# Patient Record
Sex: MALE | Race: WHITE | Employment: FULL TIME | URBAN - METROPOLITAN AREA
[De-identification: names, ages, dates, MRNs, and addresses within clinical notes are randomized per-mention and may not be internally consistent; named-entity substitution may affect disease eponyms.]

---

## 2017-01-09 ENCOUNTER — OFFICE VISIT (OUTPATIENT)
Dept: PEDIATRICS | Facility: CLINIC | Age: 37
End: 2017-01-09
Payer: COMMERCIAL

## 2017-01-09 VITALS
DIASTOLIC BLOOD PRESSURE: 80 MMHG | SYSTOLIC BLOOD PRESSURE: 118 MMHG | OXYGEN SATURATION: 98 % | TEMPERATURE: 98 F | HEART RATE: 68 BPM | HEIGHT: 72 IN | BODY MASS INDEX: 26.19 KG/M2 | WEIGHT: 193.4 LBS

## 2017-01-09 DIAGNOSIS — Z72.0 TOBACCO ABUSE: ICD-10-CM

## 2017-01-09 DIAGNOSIS — E10.21 TYPE 1 DIABETES MELLITUS WITH NEPHROPATHY (H): ICD-10-CM

## 2017-01-09 DIAGNOSIS — E78.5 HYPERLIPIDEMIA LDL GOAL <100: ICD-10-CM

## 2017-01-09 DIAGNOSIS — E10.65 TYPE 1 DIABETES MELLITUS WITH HYPERGLYCEMIA (H): Primary | ICD-10-CM

## 2017-01-09 LAB — HBA1C MFR BLD: 8.3 % (ref 4.3–6)

## 2017-01-09 PROCEDURE — 36415 COLL VENOUS BLD VENIPUNCTURE: CPT | Performed by: PEDIATRICS

## 2017-01-09 PROCEDURE — 83036 HEMOGLOBIN GLYCOSYLATED A1C: CPT | Performed by: PEDIATRICS

## 2017-01-09 PROCEDURE — 99214 OFFICE O/P EST MOD 30 MIN: CPT | Performed by: PEDIATRICS

## 2017-01-09 NOTE — NURSING NOTE
"Chief Complaint   Patient presents with     Diabetes     Lipids       Initial /80 mmHg  Pulse 68  Temp(Src) 98  F (36.7  C) (Tympanic)  Ht 5' 11.5\" (1.816 m)  Wt 193 lb 6.4 oz (87.726 kg)  BMI 26.60 kg/m2  SpO2 98% Estimated body mass index is 26.6 kg/(m^2) as calculated from the following:    Height as of this encounter: 5' 11.5\" (1.816 m).    Weight as of this encounter: 193 lb 6.4 oz (87.726 kg).  BP completed using cuff size: regular    "

## 2017-01-09 NOTE — PATIENT INSTRUCTIONS
Keeping working on cutting down on smoking - goal 4 cigs/day at our next visit or less    Continue on the tresiba, but switch your am and pm dosing - 18 units in the am and 16 units in the pm    Come back to see me in 3 months - hopefully we'll get your A1C closer to 7% by then.

## 2017-01-09 NOTE — MR AVS SNAPSHOT
"              After Visit Summary   1/9/2017    Ge Medellin    MRN: [id number]           Patient Information     Date Of Birth          1980        Visit Information        Provider Department      1/9/2017 10:20 AM Lorelei Louis MD [facility]        Today's Diagnoses     Type 1 diabetes mellitus with nephropathy, goal A1C < 7%    -  1     Tobacco abuse           Care Instructions    Keeping working on cutting down on smoking - goal 4 cigs/day at our next visit or less    Continue on the tresiba, but switch your am and pm dosing - 18 units in the am and 16 units in the pm    Come back to see me in 3 months - hopefully we'll get your A1C closer to 7% by then.          Follow-ups after your visit        Who to contact     If you have questions or need follow up information about today's clinic visit or your schedule please contact [facility] directly at [phone].  Normal or non-critical lab and imaging results will be communicated to you by MyChart, letter or phone within 4 business days after the clinic has received the results. If you do not hear from us within 7 days, please contact the clinic through [employer] or phone. If you have a critical or abnormal lab result, we will notify you by phone as soon as possible.  Submit refill requests through [employer] or call your pharmacy and they will forward the refill request to us. Please allow 3 business days for your refill to be completed.          Additional Information About Your Visit        MyChart Information     [employer] lets you send messages to your doctor, view your test results, renew your prescriptions, schedule appointments and more. To sign up, go to [email or web address] . Click on \"Log in\" on the left side of the screen, which will take you to the Welcome page. Then click on \"Sign up Now\" on the right side of the page.     You will be asked to enter the access code listed below, as well as some personal information. " "Please follow the directions to create your username and password.     Your access code is: [email or web address]  Expires: 2017 11:08 AM     Your access code will  in 90 days. If you need help or a new code, please call your [place] clinic or [phone].        Care EveryWhere ID     This is your Care EveryWhere ID. This could be used by other organizations to access your [place] medical records  [id number]        Your Vitals Were     Pulse Temperature Height BMI (Body Mass Index) Pulse Oximetry       68 98  F (36.7  C) (Tympanic) 5' 11.5\" (1.816 m) 26.60 kg/m2 98%        Blood Pressure from Last 3 Encounters:   17 118/80   10/12/16 124/80   16 120/82    Weight from Last 3 Encounters:   17 193 lb 6.4 oz (87.726 kg)   10/12/16 194 lb 12.8 oz (88.361 kg)   16 187 lb 6.4 oz (85.004 kg)              We Performed the Following     Hemoglobin A1c        Primary Care Provider Office Phone # Fax #    Lorelei Louis [phone]       [facility] [address] [place] [address]        Thank you!     Thank you for choosing [facility]  for your care. Our goal is always to provide you with excellent care. Hearing back from our patients is one way we can continue to improve our services. Please take a few minutes to complete the written survey that you may receive in the mail after your visit with us. Thank you!             Your Updated Medication List - Protect others around you: Learn how to safely use, store and throw away your medicines at www.disposemymeds.org.          This list is accurate as of: 17 11:08 AM.  Always use your most recent med list.                   Brand Name Dispense Instructions for use    ASPIRIN NOT PRESCRIBED    INTENTIONAL    0 each    continuous prn Antiplatelet medication not prescribed intentionally due to Refusal by patient       * blood glucose monitoring test strip    no brand specified    450 strip    5 times " daily. Dispense one touch ultra strips per insurance coverage5 times daily. Dispense one touch ultra strips per insurance coverage       * blood glucose monitoring test strip    no brand specified    300 strip    1 strip by In Vitro route 4 times daily       * blood glucose monitoring test strip    ONE TOUCH VERIO IQ    400 each    Use to test blood sugar 4 times daily or as directed.       * DIABETIC STERILE LANCETS device     1 Box    1 Device daily.       * blood glucose monitoring lancets     2 Box    Use to test blood sugars 4 times daily or as directed.       insulin degludec 100 UNIT/ML pen    TRESIBA    12 mL    Inject 34 Units Subcutaneous daily       insulin lispro 100 UNIT/ML injection    HumaLOG    3 Month    Inject  Subcutaneous 3 times daily (before meals). 1:10 carb correction with every meal - estimated use 30 units       lisinopril 2.5 MG tablet    PRINIVIL/Zestril    90 tablet    Take 1 tablet (2.5 mg) by mouth daily       simvastatin 40 MG tablet    ZOCOR    90 tablet    Take 1 tablet (40 mg) by mouth At Bedtime       * Notice:  This list has 5 medication(s) that are the same as other medications prescribed for you. Read the directions carefully, and ask your doctor or other care provider to review them with you.

## 2017-01-09 NOTE — PROGRESS NOTES
"  SUBJECTIVE:                                                    Ge Medellin is a 36 year old male who presents to clinic today for the following health issues:      Diabetes Follow-up    Patient is checking blood sugars: four times daily.  Results are as follows:         Averages as follow; 7 day -218, 14 day - 241, 30 day - 240, 90 day - 239day     Diabetic concerns: None     Symptoms of hypoglycemia (low blood sugar): none     Paresthesias (numbness or burning in feet) or sores: No     Date of last diabetic eye exam: 07/2016    One low that was significant since our last visit - 59 - 12/21  Fasting levels have been around 170.  Still working mostly days - sometimes has to help other shifts.    Overall, has done well with the switch to Tresiba - no side effects.    Walking more and staying active with current job - reviewed weight curves.    Has seen a significant improvement in HgbA1C over the past year.  Still not to goal.       Hyperlipidemia Follow-Up      Rate your low fat/cholesterol diet?: fair    Taking statin?  No    Other lipid medications/supplements?:  None    Doing well on statin - no side effects.       Amount of exercise or physical activity: None    Problems taking medications regularly: No    Medication side effects: none    Diet: low salt      Smoking - down to 5-6 cigs/day.   Not sure that he wants to quit, but aware that cutting down would be beneficial.    No cardiac symptoms.  No new numbness/tingling/weakness.    Problem list and histories reviewed & adjusted, as indicated.  Additional history: as documented    Problem list, Medication list, Allergies, and Medical/Social/Surgical histories reviewed in EPIC and updated as appropriate.    ROS:  Constitutional, endo, cardiovascular, pulmonarysystems are negative, except as otherwise noted.    OBJECTIVE:                                                    /80 mmHg  Pulse 68  Temp(Src) 98  F (36.7  C) (Tympanic)  Ht 5' 11.5\" (1.816 m)  Wt " 193 lb 6.4 oz (87.726 kg)  BMI 26.60 kg/m2  SpO2 98%  Body mass index is 26.6 kg/(m^2).  GENERAL: healthy, alert and no distress  RESP: lungs clear to auscultation - no rales, rhonchi or wheezes  CV: regular rate and rhythm, normal S1 S2, no S3 or S4, no murmur, click or rub, no peripheral edema and peripheral pulses strong  PSYCH: mentation appears normal, affect normal/bright    Diagnostic Test Results:  Results for orders placed or performed in visit on 01/09/17 (from the past 24 hour(s))   Hemoglobin A1c   Result Value Ref Range    Hemoglobin A1C 8.3 (H) 4.3 - 6.0 %        ASSESSMENT/PLAN:                                                        ICD-10-CM    1. Type 1 diabetes mellitus with hyperglycemia (H) E10.65 Remains uncontrolled, but improving.  Discussed adjustments to Tresiba based on blood sugar.  Patient prefers twice daily dosing.  Change dosing to 18 units q am and 16 units q pm.  Alert me with significant lows if they become a trend.   Continue to increase activity.  Follow up in 3 months.   2. Type 1 diabetes mellitus with nephropathy, goal A1C < 7% E10.21 Hemoglobin A1c   3. Tobacco abuse Z72.0 Continued to encourage smoking cessation   4.      Hyperlipidemia - well controlled - continue statin    See Patient Instructions    Lorelei Louis MD  [facility]  \

## 2017-01-30 DIAGNOSIS — E10.29 TYPE 1 DIABETES MELLITUS WITH MICROALBUMINURIA (H): Primary | ICD-10-CM

## 2017-01-30 DIAGNOSIS — R80.9 TYPE 1 DIABETES MELLITUS WITH MICROALBUMINURIA (H): Primary | ICD-10-CM

## 2017-01-30 NOTE — TELEPHONE ENCOUNTER
insulin lispro (HUMALOG) 100 UNIT/ML VIAL          Last Written Prescription Date: 07/22/2016  Last Fill Quantity: 3 boxes, # refills: 3  Last Office Visit with [facility], [facility] or [facility] prescribing provider:  01/19/2017        BP Readings from Last 3 Encounters:   01/09/17 118/80   10/12/16 124/80   07/27/16 120/82     MICROL       <5   10/5/2016  No results found for this basename: microalbumin  CREATININE   Date Value Ref Range Status   10/05/2016 0.86 0.66 - 1.25 mg/dL Final   ]  GFR ESTIMATE   Date Value Ref Range Status   10/05/2016 >90  Non  GFR Calc   >60 mL/min/1.7m2 Final   10/31/2015 >90  Non  GFR Calc   >60 mL/min/1.7m2 Final   05/28/2015 >90  Non  GFR Calc   >60 mL/min/1.7m2 Final     GFR ESTIMATE IF BLACK   Date Value Ref Range Status   10/05/2016 >90   GFR Calc   >60 mL/min/1.7m2 Final   10/31/2015 >90   GFR Calc   >60 mL/min/1.7m2 Final   05/28/2015 >90   GFR Calc   >60 mL/min/1.7m2 Final     CHOL      155   10/5/2016  HDL       56   10/5/2016  LDL       90   10/5/2016  TRIG       45   10/5/2016  CHOLHDLRATIO      2.6   10/31/2015  AST       14   10/5/2016  ALT       25   10/5/2016  A1C      8.3   1/9/2017  A1C      9.1   10/5/2016  A1C     11.7   6/22/2016  A1C     10.8   10/31/2015  A1C     10.9   5/28/2015  POTASSIUM   Date Value Ref Range Status   10/05/2016 4.4 3.4 - 5.3 mmol/L Final

## 2017-01-31 NOTE — TELEPHONE ENCOUNTER
Prescription approved per [facility] Refill Protocol.    Cristel Lennon, RN, BSN, PHN

## 2017-04-19 ENCOUNTER — TELEPHONE (OUTPATIENT)
Dept: PEDIATRICS | Facility: CLINIC | Age: 37
End: 2017-04-19

## 2017-04-19 NOTE — TELEPHONE ENCOUNTER
Type of outreach:  Phone, left message for patient to call back.   Health Maintenance Due   Topic Date Due     DIABETIC EDUCATION Q2 YEAR (NO INBASKET)  07/22/1982     DIABETIC EDUCATION Q1 YEAR (NO INBASKET)  06/03/2015     A1C Q3 MO( NO INBASKET)  04/09/2017     Patient due for DM OV, please assist pt in scheduling.     Teresa Leroy MA

## 2017-05-25 ENCOUNTER — TELEPHONE (OUTPATIENT)
Dept: PEDIATRICS | Facility: CLINIC | Age: 37
End: 2017-05-25

## 2017-05-25 DIAGNOSIS — E10.65 TYPE 1 DIABETES MELLITUS WITH HYPERGLYCEMIA (H): Primary | ICD-10-CM

## 2017-05-25 NOTE — TELEPHONE ENCOUNTER
Type of outreach:  Phone, spoke to patient.  Informed pt DM visit is due, assisted in scheduling 07/03- pt plans on having A1C done in AM, Labs futured.  Health Maintenance Due   Topic Date Due     DIABETIC EDUCATION Q2 YEAR  07/22/1982     DIABETIC EDUCATION Q1 YEAR  06/03/2015     A1C Q3 MO  04/09/2017     Teresa Leroy MA

## 2017-07-03 ENCOUNTER — OFFICE VISIT (OUTPATIENT)
Dept: PEDIATRICS | Facility: CLINIC | Age: 37
End: 2017-07-03
Payer: COMMERCIAL

## 2017-07-03 VITALS
TEMPERATURE: 98.4 F | DIASTOLIC BLOOD PRESSURE: 80 MMHG | HEIGHT: 72 IN | BODY MASS INDEX: 26.41 KG/M2 | WEIGHT: 195 LBS | SYSTOLIC BLOOD PRESSURE: 118 MMHG | HEART RATE: 72 BPM | OXYGEN SATURATION: 98 %

## 2017-07-03 DIAGNOSIS — E10.65 TYPE 1 DIABETES MELLITUS WITH HYPERGLYCEMIA (H): ICD-10-CM

## 2017-07-03 DIAGNOSIS — R80.9 TYPE 1 DIABETES MELLITUS WITH MICROALBUMINURIA (H): ICD-10-CM

## 2017-07-03 DIAGNOSIS — E10.21 TYPE 1 DIABETES MELLITUS WITH NEPHROPATHY (H): Primary | ICD-10-CM

## 2017-07-03 DIAGNOSIS — E10.29 TYPE 1 DIABETES MELLITUS WITH MICROALBUMINURIA (H): ICD-10-CM

## 2017-07-03 DIAGNOSIS — Z72.0 TOBACCO ABUSE: ICD-10-CM

## 2017-07-03 DIAGNOSIS — E78.5 HYPERLIPIDEMIA LDL GOAL <100: ICD-10-CM

## 2017-07-03 LAB — HBA1C MFR BLD: 7.7 % (ref 4.3–6)

## 2017-07-03 PROCEDURE — 99214 OFFICE O/P EST MOD 30 MIN: CPT | Performed by: PEDIATRICS

## 2017-07-03 PROCEDURE — 36415 COLL VENOUS BLD VENIPUNCTURE: CPT | Performed by: PEDIATRICS

## 2017-07-03 PROCEDURE — 83036 HEMOGLOBIN GLYCOSYLATED A1C: CPT | Performed by: PEDIATRICS

## 2017-07-03 RX ORDER — SIMVASTATIN 40 MG
40 TABLET ORAL AT BEDTIME
Qty: 90 TABLET | Refills: 3 | Status: SHIPPED | OUTPATIENT
Start: 2017-07-03 | End: 2018-08-06

## 2017-07-03 NOTE — MR AVS SNAPSHOT
"              After Visit Summary   7/3/2017    Ge Medellin    MRN: [id number]           Patient Information     Date Of Birth          1980        Visit Information        Provider Department      7/3/2017 7:00 AM Lorelei Louis MD [facility]        Today's Diagnoses     Type 1 diabetes mellitus with nephropathy, goal A1C < 7%    -  1    Type 1 diabetes mellitus with hyperglycemia (H)        Type 1 diabetes mellitus with microalbuminuria (H)        Hyperlipidemia LDL goal <100        Tobacco abuse          Care Instructions    Keep watching lows - if they continue, move tresiba to the morning - keep me posted    Let me know if you need anything to help with quitting smoking    Eye check - schedule this fall          Follow-ups after your visit        Who to contact     If you have questions or need follow up information about today's clinic visit or your schedule please contact [facility] directly at [phone].  Normal or non-critical lab and imaging results will be communicated to you by MyChart, letter or phone within 4 business days after the clinic has received the results. If you do not hear from us within 7 days, please contact the clinic through [employer] or phone. If you have a critical or abnormal lab result, we will notify you by phone as soon as possible.  Submit refill requests through [employer] or call your pharmacy and they will forward the refill request to us. Please allow 3 business days for your refill to be completed.          Additional Information About Your Visit        MyChart Information     [employer] lets you send messages to your doctor, view your test results, renew your prescriptions, schedule appointments and more. To sign up, go to [email or web address] . Click on \"Log in\" on the left side of the screen, which will take you to the Welcome page. Then click on \"Sign up Now\" on the right side of the page.     You will be asked to enter the access code " "listed below, as well as some personal information. Please follow the directions to create your username and password.     Your access code is: [email or web address]  Expires: 10/1/2017  7:36 AM     Your access code will  in 90 days. If you need help or a new code, please call your [place] clinic or [phone].        Care EveryWhere ID     This is your Care EveryWhere ID. This could be used by other organizations to access your [place] medical records  [id number]        Your Vitals Were     Pulse Temperature Height Pulse Oximetry BMI (Body Mass Index)       72 98.4  F (36.9  C) (Tympanic) 5' 11.5\" (1.816 m) 98% 26.82 kg/m2        Blood Pressure from Last 3 Encounters:   17 118/80   17 118/80   10/12/16 124/80    Weight from Last 3 Encounters:   17 195 lb (88.5 kg)   17 193 lb 6.4 oz (87.7 kg)   10/12/16 194 lb 12.8 oz (88.4 kg)              We Performed the Following     Comprehensive metabolic panel     Lipid Profile with reflex to direct LDL          Where to get your medicines      These medications were sent to [facility] [address] [place] [address] [id number] [place] [address]  [id number] [place] [address]     Phone:  [phone]     simvastatin 40 MG tablet          Primary Care Provider Office Phone # Fax #    Lorelei Louis [phone]       [place] [facility] [address] [place] [address]        Equal Access to Services     [facility]: Hadii teri loza Solauren, waaxda luqadaha, qaybta kaalmadanny moser [facility] dias. So [facility] [place] [facility] [phone].    ATENCIÓN: Si habla español, tiene a ramsey disposición servicios gratuitos de asistencia lingüística. Llame al [phone].    We comply with applicable federal civil rights laws and [place] laws. We do not discriminate on the basis of race, color, national origin, age, disability sex, sexual orientation or gender identity.            Thank you!     " Thank you for choosing [facility]  for your care. Our goal is always to provide you with excellent care. Hearing back from our patients is one way we can continue to improve our services. Please take a few minutes to complete the written survey that you may receive in the mail after your visit with us. Thank you!             Your Updated Medication List - Protect others around you: Learn how to safely use, store and throw away your medicines at www.disposemymeds.org.          This list is accurate as of: 7/3/17  7:36 AM.  Always use your most recent med list.                   Brand Name Dispense Instructions for use Diagnosis    ASPIRIN NOT PRESCRIBED    INTENTIONAL    0 each    continuous prn Antiplatelet medication not prescribed intentionally due to Refusal by patient        * blood glucose monitoring test strip    no brand specified    450 strip    5 times daily. Dispense one touch ultra strips per insurance coverage5 times daily. Dispense one touch ultra strips per insurance coverage    Type 1 diabetes, HbA1c goal < 7% (H)       * blood glucose monitoring test strip    no brand specified    300 strip    1 strip by In Vitro route 4 times daily    Type 1 diabetes, HbA1c goal < 7% (H)       * blood glucose monitoring test strip    ONE TOUCH VERIO IQ    400 each    Use to test blood sugar 4 times daily or as directed.    Type 1 diabetes mellitus with nephropathy (H)       * DIABETIC STERILE LANCETS device     1 Box    1 Device daily.    Type 1 diabetes, HbA1c goal < 7% (H)       * blood glucose monitoring lancets     2 Box    Use to test blood sugars 4 times daily or as directed.    Type 1 diabetes mellitus with nephropathy (H)       insulin degludec 100 UNIT/ML pen    TRESIBA    12 mL    Inject 34 Units Subcutaneous daily    Type 1 diabetes mellitus with hyperglycemia (H)       insulin lispro 100 UNIT/ML injection    HumaLOG    3 vial    Inject  Subcutaneous 3 times daily (before meals). 1:10 carb  correction with every meal - estimated use 30 units    Type 1 diabetes mellitus with microalbuminuria (H)       lisinopril 2.5 MG tablet    PRINIVIL/Zestril    90 tablet    Take 1 tablet (2.5 mg) by mouth daily    Type 1 diabetes mellitus with hyperglycemia (H), Type 1 diabetes mellitus with microalbuminuria (H)       simvastatin 40 MG tablet    ZOCOR    90 tablet    Take 1 tablet (40 mg) by mouth At Bedtime    Hyperlipidemia LDL goal <100, Type 1 diabetes mellitus with hyperglycemia (H), Type 1 diabetes mellitus with microalbuminuria (H)       * Notice:  This list has 5 medication(s) that are the same as other medications prescribed for you. Read the directions carefully, and ask your doctor or other care provider to review them with you.

## 2017-07-03 NOTE — PROGRESS NOTES
SUBJECTIVE:                                                    Ge Medellin is a 36 year old male who presents to clinic today for the following health issues:      Diabetes Follow-up    Patient is checking blood sugars: 3-4 times daily.    Weekly avg 176  Bi weekly avg 210  Monthly avg 209  Blood sugar testing frequency justification: Uncontrolled diabetes    Diabetic concerns: None     Symptoms of hypoglycemia (low blood sugar): weak     Paresthesias (numbness or burning in feet) or sores: No     Date of last diabetic eye exam: pt reports due in fall     Had continuous monitor last October.  Current regimen - taking 34 units long acting insulin at night, short acting insulin - 1 unit per 10 carb units (averaging 15 units per day).    Working days - better for control of blood sugar    Blood sugars stable despite activity level changes.    July 1st had a low at 50 in the middle of the night that woke him up.   About one week prior to that, had another low at night.        Hyperlipidemia Follow-Up      Rate your low fat/cholesterol diet?: fair    Taking statin?  Yes, no muscle aches from statin    Other lipid medications/supplements?:  none      Amount of exercise or physical activity: None    Problems taking medications regularly: No    Medication side effects: none    Diet: low fat/cholesterol        Tobacco use - moving to a place where roommates don't smoke.  Currently smoking about 15 cigarettes per day work days.  Has the nicotine gum ready to go for a quit date this coming weekend.      Problem list and histories reviewed & adjusted, as indicated.  Additional history: as documented    Reviewed and updated as needed this visit by clinical staff  Tobacco  Allergies  Med Hx  Surg Hx  Fam Hx  Soc Hx      Reviewed and updated as needed this visit by Provider         ROS:  Constitutional, cardiovascular, pulmonary, gi and neuro systems are negative, except as otherwise noted.    OBJECTIVE:     /80 (BP  "Location: Right arm, Patient Position: Right side, Cuff Size: Adult Regular)  Pulse 72  Temp 98.4  F (36.9  C) (Tympanic)  Ht 5' 11.5\" (1.816 m)  Wt 195 lb (88.5 kg)  SpO2 98%  BMI 26.82 kg/m2  Body mass index is 26.82 kg/(m^2).  GENERAL: alert and no distress  HENT: nose and mouth without ulcers or lesions, oropharynx clear and oral mucous membranes moist  RESP: lungs clear to auscultation - no rales, rhonchi or wheezes  ABDOMEN: soft, nontender, no hepatosplenomegaly, no masses and bowel sounds normal  PSYCH: mentation appears normal, affect normal/bright    Diagnostic Test Results:  Results for orders placed or performed in visit on 07/03/17 (from the past 24 hour(s))   Hemoglobin A1c   Result Value Ref Range    Hemoglobin A1C 7.7 (H) 4.3 - 6.0 %       ASSESSMENT/PLAN:         ICD-10-CM    1. Type 1 diabetes mellitus with nephropathy, goal A1C < 7% E10.21 Comprehensive metabolic panel     Lipid Profile with reflex to direct LDL    Improving control - congratulated patient today.  Has had a few lows since our last visit - recommended considering moving tresiba dosing to morning to see if this makes a difference - currently taking at night.  If lows remain, may need to adjust short acting insulin formulation.   2. Type 1 diabetes mellitus with hyperglycemia (H) E10.65 Comprehensive metabolic panel     Lipid Profile with reflex to direct LDL     simvastatin (ZOCOR) 40 MG tablet   3. Type 1 diabetes mellitus with microalbuminuria (H) E10.29 simvastatin (ZOCOR) 40 MG tablet    R80.9    4. Hyperlipidemia LDL goal <100 E78.5 Comprehensive metabolic panel     Lipid Profile with reflex to direct LDL     simvastatin (ZOCOR) 40 MG tablet  Well controlled, continue current medications     5. Tobacco abuse Z72.0 Planned quit date for this weekend - patient to alert me if he needs anything else to help with smoking cessation efforts.       See Patient Instructions    Lorelei Louis MD  [facility]    "

## 2017-07-03 NOTE — PATIENT INSTRUCTIONS
Keep watching lows - if they continue, move tresiba to the morning - keep me posted    Let me know if you need anything to help with quitting smoking    Eye check - schedule this fall

## 2017-07-03 NOTE — NURSING NOTE
"Chief Complaint   Patient presents with     Diabetes     Lipids       Initial /80 (BP Location: Right arm, Patient Position: Right side, Cuff Size: Adult Regular)  Pulse 72  Temp 98.4  F (36.9  C) (Tympanic)  Ht 5' 11.5\" (1.816 m)  Wt 195 lb (88.5 kg)  SpO2 98%  BMI 26.82 kg/m2 Estimated body mass index is 26.82 kg/(m^2) as calculated from the following:    Height as of this encounter: 5' 11.5\" (1.816 m).    Weight as of this encounter: 195 lb (88.5 kg).  Medication Reconciliation: complete  "

## 2017-10-24 DIAGNOSIS — E10.65 TYPE 1 DIABETES MELLITUS WITH HYPERGLYCEMIA (H): ICD-10-CM

## 2017-10-24 DIAGNOSIS — E10.29 TYPE 1 DIABETES MELLITUS WITH MICROALBUMINURIA (H): ICD-10-CM

## 2017-10-24 DIAGNOSIS — R80.9 TYPE 1 DIABETES MELLITUS WITH MICROALBUMINURIA (H): ICD-10-CM

## 2017-11-03 RX ORDER — LISINOPRIL 2.5 MG/1
2.5 TABLET ORAL DAILY
Qty: 30 TABLET | Refills: 0 | Status: SHIPPED | OUTPATIENT
Start: 2017-11-03 | End: 2017-12-27

## 2017-11-03 NOTE — TELEPHONE ENCOUNTER
TC-patient is due for blood work-orders placed. Please call to schedule a fasting lab appointment.  Medication is being filled for 1 time refill only due to:  Future labs ordered yes.     Angela Coleman RN  Message handled by Nurse Triage.

## 2017-11-15 DIAGNOSIS — E10.65 TYPE 1 DIABETES MELLITUS WITH HYPERGLYCEMIA (H): ICD-10-CM

## 2017-11-17 RX ORDER — INSULIN DEGLUDEC 100 U/ML
INJECTION, SOLUTION SUBCUTANEOUS
Qty: 45 ML | Refills: 0 | Status: SHIPPED | OUTPATIENT
Start: 2017-11-17 | End: 2018-02-05

## 2017-11-17 NOTE — TELEPHONE ENCOUNTER
Please call patient to schedule a fasting lab appointment.   The orders are in place.  Jayne Cox, LADARIUS  Triage Nurse

## 2017-11-17 NOTE — TELEPHONE ENCOUNTER
Requested Prescriptions   Pending Prescriptions Disp Refills     TRESIBA FLEXTOUCH 100 UNIT/ML pen [Pharmacy Med Name: TRESIBA FLEXTOUCH 100UNIT INJ]  11     Sig: INJECT 34 UNITS SUBCUTANEOUS DAILY    Long Acting Insulin Protocol Failed    11/15/2017  1:10 PM       Failed - LDL on file in past 12 months    Recent Labs   Lab Test  10/05/16   0710   LDL  90            Failed - Microalbumin on file in past 12 months    Recent Labs   Lab Test  10/05/16   0710   MICROL  <5   UMALCR  Unable to calculate due to low value            Failed - Serum creatinine on file in past 12 months    Recent Labs   Lab Test  10/05/16   0710   CR  0.86            Passed - Blood pressure less than 140/90 in past 6 months    BP Readings from Last 3 Encounters:   07/03/17 118/80   01/09/17 118/80   10/12/16 124/80            Passed - HgbA1C in past 3 or 6 months    Recent Labs   Lab Test  07/03/17   0703   A1C  7.7*            Passed - Patient is age 18 or older       Passed - Recent visit with authorizing provider's specialty in past 6 months    Patient had office visit in the last 6 months or has a visit in the next 30 days with authorizing provider.  See chart review.             Medication is being filled for 1 time refill only due to:  Due for fasting cholesterol.  Due for next diabetic check March.   Refilled today.  Jayne Cox, RN  Triage Nurse

## 2017-12-11 DIAGNOSIS — E10.21 TYPE 1 DIABETES MELLITUS WITH NEPHROPATHY (H): ICD-10-CM

## 2017-12-11 DIAGNOSIS — E10.65 TYPE 1 DIABETES MELLITUS WITH HYPERGLYCEMIA (H): ICD-10-CM

## 2017-12-11 DIAGNOSIS — E78.5 HYPERLIPIDEMIA LDL GOAL <100: ICD-10-CM

## 2017-12-11 LAB
ALBUMIN SERPL-MCNC: 3.9 G/DL (ref 3.4–5)
ALP SERPL-CCNC: 78 U/L (ref 40–150)
ALT SERPL W P-5'-P-CCNC: 21 U/L (ref 0–70)
ANION GAP SERPL CALCULATED.3IONS-SCNC: 8 MMOL/L (ref 3–14)
AST SERPL W P-5'-P-CCNC: 9 U/L (ref 0–45)
BILIRUB SERPL-MCNC: 0.4 MG/DL (ref 0.2–1.3)
BUN SERPL-MCNC: 13 MG/DL (ref 7–30)
CALCIUM SERPL-MCNC: 9.4 MG/DL (ref 8.5–10.1)
CHLORIDE SERPL-SCNC: 102 MMOL/L (ref 94–109)
CHOLEST SERPL-MCNC: 144 MG/DL
CO2 SERPL-SCNC: 28 MMOL/L (ref 20–32)
CREAT SERPL-MCNC: 0.7 MG/DL (ref 0.66–1.25)
GFR SERPL CREATININE-BSD FRML MDRD: >90 ML/MIN/1.7M2
GLUCOSE SERPL-MCNC: 153 MG/DL (ref 70–99)
HDLC SERPL-MCNC: 60 MG/DL
LDLC SERPL CALC-MCNC: 69 MG/DL
NONHDLC SERPL-MCNC: 84 MG/DL
POTASSIUM SERPL-SCNC: 4.1 MMOL/L (ref 3.4–5.3)
PROT SERPL-MCNC: 7.4 G/DL (ref 6.8–8.8)
SODIUM SERPL-SCNC: 138 MMOL/L (ref 133–144)
TRIGL SERPL-MCNC: 73 MG/DL

## 2017-12-11 PROCEDURE — 80061 LIPID PANEL: CPT | Performed by: PEDIATRICS

## 2017-12-11 PROCEDURE — 36415 COLL VENOUS BLD VENIPUNCTURE: CPT | Performed by: PEDIATRICS

## 2017-12-11 PROCEDURE — 80053 COMPREHEN METABOLIC PANEL: CPT | Performed by: PEDIATRICS

## 2017-12-13 DIAGNOSIS — E10.21 TYPE 1 DIABETES MELLITUS WITH NEPHROPATHY (H): Primary | ICD-10-CM

## 2017-12-27 DIAGNOSIS — R80.9 TYPE 1 DIABETES MELLITUS WITH MICROALBUMINURIA (H): ICD-10-CM

## 2017-12-27 DIAGNOSIS — E10.29 TYPE 1 DIABETES MELLITUS WITH MICROALBUMINURIA (H): ICD-10-CM

## 2017-12-27 DIAGNOSIS — E10.65 TYPE 1 DIABETES MELLITUS WITH HYPERGLYCEMIA (H): ICD-10-CM

## 2017-12-27 NOTE — TELEPHONE ENCOUNTER
Requested Prescriptions   Pending Prescriptions Disp Refills     lisinopril (PRINIVIL/ZESTRIL) 2.5 MG tablet [Pharmacy Med Name: LISINOPRIL 2.5MG    TAB]    Last Written Prescription Date:  11/3/2017  Last Fill Quantity: 30,  # refills: 0   Last Office Visit with [facility], [facility] or [facility] prescribing provider:  7/3/2017   Future Office Visit:    Next 5 appointments (look out 90 days)     Feb 05, 2018  8:00 AM CST   SHORT with Lorelei Louis MD   [facility]    [address] [place] [address] [address]  [facility] [address]   [phone]                  30 tablet 0     Sig: TAKE ONE TABLET BY MOUTH ONCE DAILY.   PATIENT  NEEDS  LABS  ORDERED.    ACE Inhibitors (Including Combos) Protocol Passed    12/27/2017 11:27 AM       Passed - Blood pressure under 140/90    BP Readings from Last 3 Encounters:   07/03/17 118/80   01/09/17 118/80   10/12/16 124/80                Passed - Recent or future visit with authorizing provider's specialty    Patient had office visit in the last year or has a visit in the next 30 days with authorizing provider.  See chart review.              Passed - Patient is age 18 or older       Passed - Normal serum creatinine on file in past 12 months    Recent Labs   Lab Test  12/11/17   0835   CR  0.70            Passed - Normal serum potassium on file in past 12 months    Recent Labs   Lab Test  12/11/17   0835   POTASSIUM  4.1

## 2017-12-27 NOTE — TELEPHONE ENCOUNTER
Requested Prescriptions   Pending Prescriptions Disp Refills     insulin lispro (HUMALOG) 100 UNIT/ML injection    Last Written Prescription Date:  1/13/2017  Last Fill Quantity: 3 ,  # refills: 1   Last Office Visit with [facility], [facility] or [facility] prescribing provider:  7/3/2017   Future Office Visit:    Next 5 appointments (look out 90 days)     Feb 05, 2018  8:00 AM CST   SHORT with Lorelei Louis MD   [facility] ([facility]    [address] [place] [address]  [address]  [facility] [address] [phone]                  3 vial 1     Sig: Inject  Subcutaneous 3 times daily (before meals). 1:10 carb correction with every meal - estimated use 30 units    Short Acting Insulin Protocol Failed    12/27/2017 12:49 PM       Failed - Microalbumin on file in past 12 months    Recent Labs   Lab Test  10/05/16   0710   MICROL  <5   UMALCR  Unable to calculate due to low value            Passed - Blood pressure less than 140/90 in past 6 months    BP Readings from Last 3 Encounters:   07/03/17 118/80   01/09/17 118/80   10/12/16 124/80                Passed - LDL on file in past 12 months    Recent Labs   Lab Test  12/11/17   0835   LDL  69            Passed - Serum creatinine on file in past 12 months    Recent Labs   Lab Test  12/11/17   0835   CR  0.70            Passed - HgbA1C in past 3 or 6 months    Recent Labs   Lab Test  07/03/17   0703   A1C  7.7*            Passed - Patient is age 18 or older       Passed - Recent visit with authorizing provider's specialty in past 6 months    Patient had office visit in the last 6 months or has a visit in the next 30 days with authorizing provider.  See chart review.

## 2017-12-29 RX ORDER — LISINOPRIL 2.5 MG/1
2.5 TABLET ORAL DAILY
Qty: 90 TABLET | Refills: 1 | Status: SHIPPED | OUTPATIENT
Start: 2017-12-29 | End: 2018-08-06

## 2017-12-29 NOTE — TELEPHONE ENCOUNTER
Pharmacy called, Humalog vials were sent, patient was picking up Humalog pens.   Changed rx and resent per Dr. Louis's previous orders

## 2018-02-05 ENCOUNTER — OFFICE VISIT (OUTPATIENT)
Dept: PEDIATRICS | Facility: CLINIC | Age: 38
End: 2018-02-05
Payer: COMMERCIAL

## 2018-02-05 VITALS
SYSTOLIC BLOOD PRESSURE: 118 MMHG | WEIGHT: 191 LBS | DIASTOLIC BLOOD PRESSURE: 78 MMHG | TEMPERATURE: 97.3 F | OXYGEN SATURATION: 98 % | BODY MASS INDEX: 25.87 KG/M2 | HEIGHT: 72 IN | HEART RATE: 74 BPM

## 2018-02-05 DIAGNOSIS — E10.29 TYPE 1 DIABETES MELLITUS WITH MICROALBUMINURIA (H): ICD-10-CM

## 2018-02-05 DIAGNOSIS — E10.21 TYPE 1 DIABETES MELLITUS WITH NEPHROPATHY (H): Primary | ICD-10-CM

## 2018-02-05 DIAGNOSIS — E78.5 HYPERLIPIDEMIA LDL GOAL <100: ICD-10-CM

## 2018-02-05 DIAGNOSIS — R80.9 TYPE 1 DIABETES MELLITUS WITH MICROALBUMINURIA (H): ICD-10-CM

## 2018-02-05 DIAGNOSIS — E10.65 TYPE 1 DIABETES MELLITUS WITH HYPERGLYCEMIA (H): ICD-10-CM

## 2018-02-05 DIAGNOSIS — Z72.0 TOBACCO ABUSE: ICD-10-CM

## 2018-02-05 LAB
CREAT UR-MCNC: 63 MG/DL
HBA1C MFR BLD: 9 % (ref 4.3–6)
MICROALBUMIN UR-MCNC: 7 MG/L
MICROALBUMIN/CREAT UR: 11.61 MG/G CR (ref 0–17)
TSH SERPL DL<=0.005 MIU/L-ACNC: 0.84 MU/L (ref 0.4–4)

## 2018-02-05 PROCEDURE — 99214 OFFICE O/P EST MOD 30 MIN: CPT | Performed by: PEDIATRICS

## 2018-02-05 PROCEDURE — 83036 HEMOGLOBIN GLYCOSYLATED A1C: CPT | Performed by: PEDIATRICS

## 2018-02-05 PROCEDURE — 99207 C FOOT EXAM  NO CHARGE: CPT | Performed by: PEDIATRICS

## 2018-02-05 PROCEDURE — 82043 UR ALBUMIN QUANTITATIVE: CPT | Performed by: PEDIATRICS

## 2018-02-05 PROCEDURE — 84443 ASSAY THYROID STIM HORMONE: CPT | Performed by: PEDIATRICS

## 2018-02-05 PROCEDURE — 36415 COLL VENOUS BLD VENIPUNCTURE: CPT | Performed by: PEDIATRICS

## 2018-02-05 NOTE — MR AVS SNAPSHOT
After Visit Summary   2/5/2018    Ge Medellin    MRN: [id number]           Patient Information     Date Of Birth          1980        Visit Information        Provider Department      2/5/2018 8:00 AM Lorelei Louis MD [facility]        Today's Diagnoses     Type 1 diabetes mellitus with nephropathy, goal A1C < 7%    -  1    Hyperlipidemia LDL goal <100        Type 1 diabetes mellitus with microalbuminuria (H)        Type 1 diabetes mellitus with hyperglycemia (H)          Care Instructions    Start covering your carbs carefully    Labs today on your way out    Make an eye appt - #'s below    Come back to see me in about 3 months            Follow-ups after your visit        Additional Services     OPHTHALMOLOGY ADULT REFERRAL       Your provider has referred you to: [facility]: [facility], JAMES Tai  [phone]  [email or web address]  [facility]: [facility] JAMES Ramirez [phone]   [email or web address]    Please be aware that coverage of these services is subject to the terms and limitations of your health insurance plan.  Call member services at your health plan with any benefit or coverage questions.      Please bring the following with you to your appointment:    (1) Any X-Rays, CTs or MRIs which have been performed.  Contact the facility where they were done to arrange for  prior to your scheduled appointment.    (2) List of current medications  (3) This referral request   (4) Any documents/labs given to you for this referral                  Who to contact     If you have questions or need follow up information about today's clinic visit or your schedule please contact [facility] directly at [phone].  Normal or non-critical lab and imaging results will be communicated to you by MyChart, letter or phone within 4 business days after the clinic has received the results. If you do not hear from us within 7 days,  "please contact the clinic through SafetyTat or phone. If you have a critical or abnormal lab result, we will notify you by phone as soon as possible.  Submit refill requests through Safety[employer] or call your pharmacy and they will forward the refill request to us. Please allow 3 business days for your refill to be completed.          Additional Information About Your Visit        [employer] Information     SafetyTat lets you send messages to your doctor, view your test results, renew your prescriptions, schedule appointments and more. To sign up, go to [email or web address] . Click on \"Log in\" on the left side of the screen, which will take you to the Welcome page. Then click on \"Sign up Now\" on the right side of the page.     You will be asked to enter the access code listed below, as well as some personal information. Please follow the directions to create your username and password.     Your access code is: [email or web address]  Expires: 3/14/2018  7:15 AM     Your access code will  in 90 days. If you need help or a new code, please call your [facility] clinic or [phone].        Care EveryWhere ID     This is your Care EveryWhere ID. This could be used by other organizations to access your [facility] medical records  [id number]        Your Vitals Were     Pulse Temperature Height Pulse Oximetry BMI (Body Mass Index)       74 97.3  F (36.3  C) (Tympanic) 5' 11.5\" (1.816 m) 98% 26.27 kg/m2        Blood Pressure from Last 3 Encounters:   18 118/78   17 118/80   17 118/80    Weight from Last 3 Encounters:   18 191 lb (86.6 kg)   17 195 lb (88.5 kg)   17 193 lb 6.4 oz (87.7 kg)              We Performed the Following     Albumin Random Urine Quantitative with Creat Ratio     FOOT EXAM     Hemoglobin A1c     OPHTHALMOLOGY ADULT REFERRAL     TSH with free T4 reflex          Today's Medication Changes          These changes are accurate as of 18  8:32 AM.  If you have any questions, ask " your nurse or doctor.               These medicines have changed or have updated prescriptions.        Dose/Directions    insulin degludec 100 UNIT/ML pen   Commonly known as:  TRESIBA FLEXTOUCH   This may have changed:  See the new instructions.   Used for:  Type 1 diabetes mellitus with hyperglycemia (H)   Changed by:  Lorelei Louis MD        INJECT 34 UNITS SUBCUTANEOUS DAILY   Quantity:  45 mL   Refills:  3            Where to get your medicines      These medications were sent to [facility] [address] [place] [address] [facility]  [address] [facility] [address]     Phone:  [phone]     insulin degludec 100 UNIT/ML pen    insulin lispro 100 UNIT/ML injection                Primary Care Provider Office Phone # Fax #    Lorelei Louis [phone] [address] [place] [address]        Equal Access to Services     [facility]: Hadii aad ku hadasho Soomaali, waaxda luqadaha, qaybta kaalmada adeegyada, waxay idiin hayaan adeslade kharaoh pattersonn . So [facility] [phone].    ATENCIÓN: Si habla español, tiene a ramsey disposición servicios gratuitos de asistencia lingüística. [facility] [phone].    We comply with applicable federal civil rights laws and [place] laws. We do not discriminate on the basis of race, color, national origin, age, disability, sex, sexual orientation, or gender identity.            Thank you!     Thank you for choosing [facility]  for your care. Our goal is always to provide you with excellent care. Hearing back from our patients is one way we can continue to improve our services. Please take a few minutes to complete the written survey that you may receive in the mail after your visit with us. Thank you!             Your Updated Medication List - Protect others around you: Learn how to safely use, store and throw away your medicines at www.disposemymeds.org.          This list is accurate as of 2/5/18   8:32 AM.  Always use your most recent med list.                   Brand Name Dispense Instructions for use Diagnosis    ASPIRIN NOT PRESCRIBED    INTENTIONAL    0 each    continuous prn Antiplatelet medication not prescribed intentionally due to Refusal by patient        * blood glucose monitoring test strip    no brand specified    450 strip    5 times daily. Dispense one touch ultra strips per insurance coverage5 times daily. Dispense one touch ultra strips per insurance coverage    Type 1 diabetes, HbA1c goal < 7% (H)       * blood glucose monitoring test strip    no brand specified    300 strip    1 strip by In Vitro route 4 times daily    Type 1 diabetes, HbA1c goal < 7% (H)       * blood glucose monitoring test strip    ONETOUCH VERIO IQ    400 each    Use to test blood sugar 4 times daily or as directed.    Type 1 diabetes mellitus with nephropathy (H)       * DIABETIC STERILE LANCETS device     1 Box    1 Device daily.    Type 1 diabetes, HbA1c goal < 7% (H)       * blood glucose monitoring lancets     2 Box    Use to test blood sugars 4 times daily or as directed.    Type 1 diabetes mellitus with nephropathy (H)       insulin degludec 100 UNIT/ML pen    TRESIBA FLEXTOUCH    45 mL    INJECT 34 UNITS SUBCUTANEOUS DAILY    Type 1 diabetes mellitus with hyperglycemia (H)       insulin lispro 100 UNIT/ML injection    HumaLOG PEN    30 mL    Inject Subcutaneous 3 times daily (before meals). 1:10 carb correction with every meal - estimated use 30 units    Type 1 diabetes mellitus with microalbuminuria (H)       lisinopril 2.5 MG tablet    PRINIVIL/Zestril    90 tablet    Take 1 tablet (2.5 mg) by mouth daily    Type 1 diabetes mellitus with hyperglycemia (H), Type 1 diabetes mellitus with microalbuminuria (H)       simvastatin 40 MG tablet    ZOCOR    90 tablet    Take 1 tablet (40 mg) by mouth At Bedtime    Hyperlipidemia LDL goal <100, Type 1 diabetes mellitus with hyperglycemia (H), Type 1 diabetes mellitus  with microalbuminuria (H)       * Notice:  This list has 5 medication(s) that are the same as other medications prescribed for you. Read the directions carefully, and ask your doctor or other care provider to review them with you.

## 2018-02-05 NOTE — PROGRESS NOTES
SUBJECTIVE:   Ge Medellin is a 37 year old male who presents to clinic today for the following health issues:      Diabetes Follow-up    Patient is checking blood sugars: 3-4 times daily.    Blood sugar testing frequency justification: Uncontrolled diabetes  Results are as follows:         average 210    Diabetic concerns: blood sugar frequently over 200     Symptoms of hypoglycemia (low blood sugar): shaky, dizzy, Symptoms occur if sugars < 60.    Has lows on average once per month - no dangerous lows     Paresthesias (numbness or burning in feet) or sores: No     Date of last diabetic eye exam: pt due, informed     BP Readings from Last 2 Encounters:   02/05/18 118/78   07/03/17 118/80     Hemoglobin A1C (%)   Date Value   07/03/2017 7.7 (H)   01/09/2017 8.3 (H)     LDL Cholesterol Calculated (mg/dL)   Date Value   12/11/2017 69   10/05/2016 90     Hyperlipidemia Follow-Up      Rate your low fat/cholesterol diet?: fair    Taking statin?  Yes, no muscle aches from statin    Other lipid medications/supplements?:  none      Amount of exercise or physical activity: None    Problems taking medications regularly: No    Medication side effects: none    Diet: low salt and low fat/cholesterol      Tobacco abuse - plans to quit when stops school in June - completing his associates degree.    Diet has not been good recently and has been not as careful with covering his carb intake with humolog as he had been prior when his control was better.    Problem list and histories reviewed & adjusted, as indicated.  Additional history: as documented      Reviewed and updated as needed this visit by clinical staff  Tobacco  Allergies  Med Hx  Surg Hx  Fam Hx  Soc Hx      Reviewed and updated as needed this visit by Provider         ROS:  Constitutional, HEENT, cardiovascular, pulmonary, gi and neuro systems are negative, except as otherwise noted.    OBJECTIVE:     /78 (BP Location: Right arm, Patient Position: Right side,  "Cuff Size: Adult Regular)  Pulse 74  Temp 97.3  F (36.3  C) (Tympanic)  Ht 5' 11.5\" (1.816 m)  Wt 191 lb (86.6 kg)  SpO2 98%  BMI 26.27 kg/m2  Body mass index is 26.27 kg/(m^2).  GENERAL: healthy, alert and no distress  RESP: lungs clear to auscultation - no rales, rhonchi or wheezes  CV: regular rate and rhythm, normal S1 S2, no S3 or S4, no murmur, click or rub, no peripheral edema and peripheral pulses strong  ABD: no lipodystrophy  Diabetic foot exam: normal DP and PT pulses, no trophic changes or ulcerative lesions and normal sensory exam    Diagnostic Test Results:  pending    ASSESSMENT/PLAN:         ICD-10-CM    1. Type 1 diabetes mellitus with nephropathy, goal A1C < 7% E10.21 TSH with free T4 reflex     Hemoglobin A1c     Albumin Random Urine Quantitative with Creat Ratio     FOOT EXAM     OPHTHALMOLOGY ADULT REFERRAL       Discussed following carb counting more intentionally, rechecking in 3 months.  Patient in agreement.    Continue tresiba at current dose        2. Hyperlipidemia LDL goal <100 E78.5 TSH with free T4 reflex     Well controlled, continue current medications          3. Tobacco abuse Z72.0 Recommended quitting - patient agreeable to quitting in June.   4. Type 1 diabetes mellitus with microalbuminuria (H) E10.29 insulin lispro (HUMALOG PEN) 100 UNIT/ML injection  See above    R80.9    5. Type 1 diabetes mellitus with hyperglycemia (H) E10.65 insulin degludec (TRESIBA FLEXTOUCH) 100 UNIT/ML pen  See above       See Patient Instructions    Lorelei Louis MD  [facility]  "

## 2018-02-05 NOTE — PATIENT INSTRUCTIONS
Start covering your carbs carefully    Labs today on your way out    Make an eye appt - #'s below    Come back to see me in about 3 months

## 2018-02-05 NOTE — LETTER
[facility]  [address] [facility] [address]                  [phone]   February 6, 2018    Ge Medellin  [address]  [place] [address]        Dear Ge,    Please find your lab work enclosed for your review and records.  The results show normal urine protein and thyroid hormone levels.  Great news.    Your HgbA1C is up a bit, as you predicted, to 9%.    Please keep close tabs on your carb coverage and come back to see me for a recheck in 3 months.  Please let me know sooner if you have new questions or concerns.    Best regards,    Lorelei Louis MD  Internal Medicine - Pediatrics      Results for orders placed or performed in visit on 02/05/18   TSH with free T4 reflex   Result Value Ref Range    TSH 0.84 0.40 - 4.00 mU/L   Hemoglobin A1c   Result Value Ref Range    Hemoglobin A1C 9.0 (H) 4.3 - 6.0 %   Albumin Random Urine Quantitative with Creat Ratio   Result Value Ref Range    Creatinine Urine 63 mg/dL    Albumin Urine mg/L 7 mg/L    Albumin Urine mg/g Cr 11.61 0 - 17 mg/g Cr

## 2018-04-17 ENCOUNTER — TELEPHONE (OUTPATIENT)
Dept: PEDIATRICS | Facility: CLINIC | Age: 38
End: 2018-04-17

## 2018-04-17 NOTE — TELEPHONE ENCOUNTER
Type of outreach:  Phone, spoke to patient.  Informed pt of overdue DM f/u, appt scheduled 05/07/18.  Health Maintenance Due   Topic Date Due     DIABETIC EDUCATION Q2 YEAR  07/22/1982     DIABETIC EDUCATION Q1 YEAR  06/03/2015     EYE EXAM Q1 YEAR  07/27/2017     A1C Q3 MO  05/05/2018     Teresa Leroy MA

## 2018-05-07 ENCOUNTER — OFFICE VISIT (OUTPATIENT)
Dept: PEDIATRICS | Facility: CLINIC | Age: 38
End: 2018-05-07
Payer: COMMERCIAL

## 2018-05-07 VITALS
BODY MASS INDEX: 26.28 KG/M2 | DIASTOLIC BLOOD PRESSURE: 72 MMHG | HEIGHT: 72 IN | SYSTOLIC BLOOD PRESSURE: 118 MMHG | HEART RATE: 72 BPM | TEMPERATURE: 98.3 F | WEIGHT: 194 LBS

## 2018-05-07 DIAGNOSIS — Z72.0 TOBACCO ABUSE: ICD-10-CM

## 2018-05-07 DIAGNOSIS — E10.21 TYPE 1 DIABETES MELLITUS WITH NEPHROPATHY (H): Primary | ICD-10-CM

## 2018-05-07 DIAGNOSIS — S49.92XA SHOULDER INJURY, LEFT, INITIAL ENCOUNTER: ICD-10-CM

## 2018-05-07 DIAGNOSIS — E10.65 TYPE 1 DIABETES MELLITUS WITH HYPERGLYCEMIA (H): Primary | ICD-10-CM

## 2018-05-07 DIAGNOSIS — E10.29 TYPE 1 DIABETES MELLITUS WITH MICROALBUMINURIA (H): ICD-10-CM

## 2018-05-07 DIAGNOSIS — R80.9 TYPE 1 DIABETES MELLITUS WITH MICROALBUMINURIA (H): ICD-10-CM

## 2018-05-07 LAB — HBA1C MFR BLD: 9.1 % (ref 0–5.6)

## 2018-05-07 PROCEDURE — 99214 OFFICE O/P EST MOD 30 MIN: CPT | Performed by: PEDIATRICS

## 2018-05-07 PROCEDURE — 36415 COLL VENOUS BLD VENIPUNCTURE: CPT | Performed by: PEDIATRICS

## 2018-05-07 PROCEDURE — 83036 HEMOGLOBIN GLYCOSYLATED A1C: CPT | Performed by: PEDIATRICS

## 2018-05-07 NOTE — MR AVS SNAPSHOT
After Visit Summary   5/7/2018    Ge Medellin    MRN: [id number]           Patient Information     Date Of Birth          1980        Visit Information        Provider Department      5/7/2018 10:20 AM Lorelei Louis MD [facility]        Today's Diagnoses     Type 1 diabetes mellitus with hyperglycemia (H)    -  1    Tobacco abuse        Shoulder injury, left, initial encounter          Care Instructions    Keep working on weight loss    Come back to see me in August with labs a few days prior    Expect a phone call to schedule a visit with orthopedics to talk about your shoulder    Let me know if I can help with quitting smoking          Follow-ups after your visit        Additional Services     ORTHO  REFERRAL       [facility] is referring you to the Orthopedic  Services at [facility].       The  Representative will assist you in the coordination of your Orthopedic and Musculoskeletal Care as prescribed by your physician.    The  Representative will call you within 1 business day to help schedule your appointment, or you may contact the  Representative at:    All areas ~ [phone]     Type of Referral : Non Surgical       Timeframe requested: Routine    Coverage of these services is subject to the terms and limitations of your health insurance plan.  Please call member services at your health plan with any benefit or coverage questions.      If X-rays, CT or MRI's have been performed, please contact the facility where they were done to arrange for , prior to your scheduled appointment.  Please bring this referral request to your appointment and present it to your specialist.                  Follow-up notes from your care team     Return in about 3 months (around 8/7/2018) for diabetes visit.      Your next 10 appointments already scheduled     Aug 06, 2018  7:00 AM CDT   Office Visit  "with Lorelei Louis MD   [facility] ([facility]    [address]  [address]  [facility] [address] [phone]           Bring a current list of meds and any records pertaining to this visit. For Physicals, please bring immunization records and any forms needing to be filled out. Please arrive 10 minutes early to complete paperwork.              Who to contact     If you have questions or need follow up information about today's clinic visit or your schedule please contact [facility] directly at [phone].  Normal or non-critical lab and imaging results will be communicated to you by Coolturehart, letter or phone within 4 business days after the clinic has received the results. If you do not hear from us within 7 days, please contact the clinic through Coolturehart or phone. If you have a critical or abnormal lab result, we will notify you by phone as soon as possible.  Submit refill requests through MySmartPrice or call your pharmacy and they will forward the refill request to us. Please allow 3 business days for your refill to be completed.          Additional Information About Your Visit        MyChart Information     MySmartPrice lets you send messages to your doctor, view your test results, renew your prescriptions, schedule appointments and more. To sign up, go to [email or web address] . Click on \"Log in\" on the left side of the screen, which will take you to the Welcome page. Then click on \"Sign up Now\" on the right side of the page.     You will be asked to enter the access code listed below, as well as some personal information. Please follow the directions to create your username and password.     Your access code is: [email or web address]  Expires: 2018 11:09 AM     Your access code will  in 90 days. If you need help or a new code, please call your [facility] or [phone].        Care EveryWhere ID     This is your Care EveryWhere ID. This could be " "used by other organizations to access your [facility] medical records  [id number]        Your Vitals Were     Pulse Temperature Height BMI (Body Mass Index)          72 98.3  F (36.8  C) (Tympanic) 5' 11.5\" (1.816 m) 26.68 kg/m2         Blood Pressure from Last 3 Encounters:   05/07/18 118/72   02/05/18 118/78   07/03/17 118/80    Weight from Last 3 Encounters:   05/07/18 194 lb (88 kg)   02/05/18 191 lb (86.6 kg)   07/03/17 195 lb (88.5 kg)              We Performed the Following     ORTHO  REFERRAL          Today's Medication Changes          These changes are accurate as of 5/7/18 11:09 AM.  If you have any questions, ask your nurse or doctor.               These medicines have changed or have updated prescriptions.        Dose/Directions    TRESIBA FLEXTOUCH 100 UNIT/ML pen   This may have changed:  additional instructions   Used for:  Type 1 diabetes mellitus with hyperglycemia (H)   Generic drug:  insulin degludec   Changed by:  Lorelei Louis MD        INJECT 38 UNITS SUBCUTANEOUS DAILY   Quantity:  45 mL   Refills:  3                Primary Care Provider Office Phone # Fax #    Lorelei Louis [phone]       [facility] [place] [address]        Equal Access to Services     [facility] AH: Hadii teri parada hadasho Soomaali, waaxda luqadaha, qaybta kaalmada adeegyada, danny dias. So [facility] [phone].    ATENCIÓN: Si habla español, tiene a ramsey disposición servicios gratuitos de asistencia lingüística. Llame al [phone].    We comply with applicable federal civil rights laws and [place] laws. We do not discriminate on the basis of race, color, national origin, age, disability, sex, sexual orientation, or gender identity.            Thank you!     Thank you for choosing [facility]  for your care. Our goal is always to provide you with excellent care. Hearing back from our patients is one way we can continue to " improve our services. Please take a few minutes to complete the written survey that you may receive in the mail after your visit with us. Thank you!             Your Updated Medication List - Protect others around you: Learn how to safely use, store and throw away your medicines at www.disposemymeds.org.          This list is accurate as of 5/7/18 11:09 AM.  Always use your most recent med list.                   Brand Name Dispense Instructions for use Diagnosis    ASPIRIN NOT PRESCRIBED    INTENTIONAL    0 each    continuous prn Antiplatelet medication not prescribed intentionally due to Refusal by patient        * blood glucose monitoring test strip    no brand specified    450 strip    5 times daily. Dispense one touch ultra strips per insurance coverage5 times daily. Dispense one touch ultra strips per insurance coverage    Type 1 diabetes, HbA1c goal < 7% (H)       * blood glucose monitoring test strip    no brand specified    300 strip    1 strip by In Vitro route 4 times daily    Type 1 diabetes, HbA1c goal < 7% (H)       * blood glucose monitoring test strip    ONETOUCH VERIO IQ    400 each    Use to test blood sugar 4 times daily or as directed.    Type 1 diabetes mellitus with nephropathy (H)       * DIABETIC STERILE LANCETS device     1 Box    1 Device daily.    Type 1 diabetes, HbA1c goal < 7% (H)       * blood glucose monitoring lancets     2 Box    Use to test blood sugars 4 times daily or as directed.    Type 1 diabetes mellitus with nephropathy (H)       insulin lispro 100 UNIT/ML injection    HumaLOG PEN    30 mL    Inject Subcutaneous 3 times daily (before meals). 1:10 carb correction with every meal - estimated use 30 units    Type 1 diabetes mellitus with microalbuminuria (H)       lisinopril 2.5 MG tablet    PRINIVIL/Zestril    90 tablet    Take 1 tablet (2.5 mg) by mouth daily    Type 1 diabetes mellitus with hyperglycemia (H), Type 1 diabetes mellitus with microalbuminuria (H)        simvastatin 40 MG tablet    ZOCOR    90 tablet    Take 1 tablet (40 mg) by mouth At Bedtime    Hyperlipidemia LDL goal <100, Type 1 diabetes mellitus with hyperglycemia (H), Type 1 diabetes mellitus with microalbuminuria (H)       TRESIBA FLEXTOUCH 100 UNIT/ML pen   Generic drug:  insulin degludec     45 mL    INJECT 38 UNITS SUBCUTANEOUS DAILY    Type 1 diabetes mellitus with hyperglycemia (H)       * Notice:  This list has 5 medication(s) that are the same as other medications prescribed for you. Read the directions carefully, and ask your doctor or other care provider to review them with you.

## 2018-05-07 NOTE — PROGRESS NOTES
SUBJECTIVE:   Ge Medellin is a 37 year old male who presents to clinic today for the following health issues:      Diabetes Follow-up    Patient is checking blood sugars: 3-4 times daily.    Blood sugar testing frequency justification: Uncontrolled diabetes and Patient modifying lifestyle changes (diet, exercise) with blood sugars  Results are as follows:         Monthly average - 242    14 days average- 215    Diabetic concerns: blood sugar frequently over 200     Symptoms of hypoglycemia (low blood sugar): Symptoms occur if sugars < 80, 60 is lowest - this happens if has gone too long without eating.  Happening once every quarter on average - has never had a dangerous low.     Paresthesias (numbness or burning in feet) or sores: No      Date of last diabetic eye exam: pt due - informed     Difficulty with increased carbs and not covering this winter.  Doing better with grilling and vegetables.      Current insulin schedule:  Usually has lunch around 10mg (first meal) 7 carbs there - would give 7 units of humalog.  Will check after correction   At night, will have dinner (pork loin and salad tonight) 2 carbs there - 2 units  On average, will have 2 meals per day.      Taking tresiba at night - 34 units    Tobacco abuse - currently smoking 1/2 ppd - work is biggest trigger      BP Readings from Last 2 Encounters:   05/07/18 118/72   02/05/18 118/78     Hemoglobin A1C (%)   Date Value   05/07/2018 9.1 (H)   02/05/2018 9.0 (H)     LDL Cholesterol Calculated (mg/dL)   Date Value   12/11/2017 69   10/05/2016 90     Hyperlipidemia Follow-Up      Rate your low fat/cholesterol diet?: poor    Taking statin?  Yes, no muscle aches from statin    Other lipid medications/supplements?:  none        Problems taking medications regularly: No    Medication side effects: none    Diet: low salt    Left shoulder-   6 weeks ago, woke up and shoulder was starting to hurt.  When moving head to the right, pain shoots down back of  "shoulder.  Getting numbness/tingling in the left arm.  No weakness.  Numbness and tingling going down arm from shoulder.   Different movements will make it worse.  Pain averages 4/10.   Using heat without success.   Had been using ibuprofen without improvement.    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          Problem list and histories reviewed & adjusted, as indicated.  Additional history: as documented      Reviewed and updated as needed this visit by clinical staff  Tobacco  Allergies  Meds  Med Hx  Surg Hx  Fam Hx  Soc Hx      Reviewed and updated as needed this visit by Provider         ROS:  Constitutional, HEENT, cardiovascular, pulmonary, gi and msk, neuro systems are negative, except as otherwise noted.    OBJECTIVE:     /72 (BP Location: Right arm, Patient Position: Right side, Cuff Size: Adult Large)  Pulse 72  Temp 98.3  F (36.8  C) (Tympanic)  Ht 5' 11.5\" (1.816 m)  Wt 194 lb (88 kg)  BMI 26.68 kg/m2  Body mass index is 26.68 kg/(m^2).  GENERAL: healthy, alert and no distress  RESP: lungs clear to auscultation - no rales, rhonchi or wheezes  CV: regular rate and rhythm, normal S1 S2, no S3 or S4, no murmur, click or rub, no peripheral edema and peripheral pulses strong  MS: tender to palpation over posterior left shoulder, normal range motion, negative Hawkin's test, negative crossover, pain with rightward gaze of neck over posterior left scapula  SKIN: no lipodystrophy of abdomen at insulin injection sites  PSYCH: mentation appears normal, affect normal/bright    Diagnostic Test Results:  Results for orders placed or performed in visit on 05/07/18 (from the past 24 hour(s))   Hemoglobin A1c   Result Value Ref Range    Hemoglobin A1C 9.1 (H) 0 - 5.6 %       ASSESSMENT/PLAN:       ICD-10-CM    1. Type 1 diabetes mellitus with hyperglycemia (H) E10.65 insulin degludec (TRESIBA FLEXTOUCH) 100 UNIT/ML pen     Hemoglobin A1c     Basic metabolic panel    No lows, persistent highs - " plan to increase tresiba to 38 units, patient to count carbs and treat more regularly.  Follow up in 3 months with labs prior.   2. Type 1 diabetes mellitus with microalbuminuria (H) E10.29 insulin degludec (TRESIBA FLEXTOUCH) 100 UNIT/ML pen    R80.9 Hemoglobin A1c     Basic metabolic panel   3. Tobacco abuse Z72.0 Encouraged smoking cessation - patient hopes to quit this summer with his girlfriend   4. Shoulder injury, left, initial encounter S49.92XA ORTHO  REFERRAL  Hx of recurrent shoulder pain, now persistent pain for >6 weeks - [facility] referral placed       See Patient Instructions    Lorelei Louis MD  [facility]

## 2018-05-07 NOTE — PATIENT INSTRUCTIONS
Keep working on weight loss    Come back to see me in August with labs a few days prior    Expect a phone call to schedule a visit with orthopedics to talk about your shoulder    Let me know if I can help with quitting smoking

## 2018-05-11 ENCOUNTER — RADIANT APPOINTMENT (OUTPATIENT)
Dept: GENERAL RADIOLOGY | Facility: CLINIC | Age: 38
End: 2018-05-11
Attending: FAMILY MEDICINE
Payer: COMMERCIAL

## 2018-05-11 ENCOUNTER — OFFICE VISIT (OUTPATIENT)
Dept: ORTHOPEDICS | Facility: CLINIC | Age: 38
End: 2018-05-11
Payer: COMMERCIAL

## 2018-05-11 VITALS
HEIGHT: 72 IN | SYSTOLIC BLOOD PRESSURE: 120 MMHG | BODY MASS INDEX: 26.28 KG/M2 | WEIGHT: 194 LBS | DIASTOLIC BLOOD PRESSURE: 76 MMHG

## 2018-05-11 DIAGNOSIS — M54.2 CERVICALGIA: ICD-10-CM

## 2018-05-11 DIAGNOSIS — G89.29 CHRONIC LEFT SHOULDER PAIN: Primary | ICD-10-CM

## 2018-05-11 DIAGNOSIS — E10.65 TYPE 1 DIABETES MELLITUS WITH HYPERGLYCEMIA (H): ICD-10-CM

## 2018-05-11 DIAGNOSIS — Z72.0 TOBACCO ABUSE: ICD-10-CM

## 2018-05-11 DIAGNOSIS — M25.512 CHRONIC LEFT SHOULDER PAIN: ICD-10-CM

## 2018-05-11 DIAGNOSIS — M25.512 CHRONIC LEFT SHOULDER PAIN: Primary | ICD-10-CM

## 2018-05-11 DIAGNOSIS — G89.29 CHRONIC LEFT SHOULDER PAIN: ICD-10-CM

## 2018-05-11 PROCEDURE — 73030 X-RAY EXAM OF SHOULDER: CPT | Mod: LT

## 2018-05-11 PROCEDURE — 99243 OFF/OP CNSLTJ NEW/EST LOW 30: CPT | Performed by: FAMILY MEDICINE

## 2018-05-11 NOTE — Clinical Note
Michael Louis, Just wanted to draw your attention to the fact that Ge states that he is ready to stop smoking.  Did briefly discuss options for smoking cessation however he feels he can manage on his own.  Fidelina Garcia DO, [facility]

## 2018-05-11 NOTE — LETTER
5/11/2018         RE: Ge Medellin  [address] [place] [address]        Dear Colleague,    Thank you for referring your patient, Ge Medellin, to the [facility]. Please see a copy of my visit note below.    ASSESSMENT & PLAN    1. Chronic left shoulder pain    2. Cervicalgia    3. Tobacco abuse    4. Type 1 diabetes mellitus with hyperglycemia (H)      Physical therapy: [facility] - [phone]  Discussed exam - pain appears to be coming from your neck  No concern for rotator cuff tear or tendonitis. No frozen shoulder  Reviewed xray - no arthritis and no finding that would predispose you to arthritis  Congrats on being ready to stop smoking.  Continue to work with Dr. Louis on controlling your blood sugar. Given how high your blood sugar is would not recommend using steroid.    Follow up after 4 - 6 therapy sessions.     -----    SUBJECTIVE  Ge Medellin is a/an 37 year old Right handed male who is seen in consultation at the request of  Lorelei Louis M.D. for evaluation of left shoulder pain. The patient is seen by themselves.    Onset: 2 month(s) ago. Reports insidious onset without acute precipitating event.  Location of Pain: left posterior shoulder/scapula  Rating of Pain at worst: 7/10  Rating of Pain Currently: 5/10  Worsened by: driving, laying supine, reaching behind back  Better with: nothing  Treatments tried: ice, heat and ibuprofen  Associated symptoms:  denies swelling or warmth - some tingling to elbow  Orthopedic history: NO  Relevant surgical history: NO  Patient Social History: works at     Patient's past medical, surgical, social, and family histories were reviewed today and no changes are noted.    REVIEW OF SYSTEMS:  10 point ROS is negative other than symptoms noted above in HPI, Past Medical History or as stated below  Constitutional: NEGATIVE for fever, chills, change in weight  Skin: NEGATIVE for worrisome rashes, moles or  lesions  GI/: NEGATIVE for bowel or bladder changes  Neuro: NEGATIVE for weakness, dizziness or paresthesias    OBJECTIVE:  /76  Ht 6' (1.829 m)  Wt 194 lb (88 kg)  BMI 26.31 kg/m2   General: healthy, alert and in no distress  HEENT: no scleral icterus or conjunctival erythema  Skin: no suspicious lesions or rash. No jaundice.  CV: regular rhythm by palpation  Resp: normal respiratory effort without conversational dyspnea   Psych: normal mood and affect  Gait: normal steady gait with appropriate coordination and balance  Neuro: normal light touch sensory exam of the bilateral upper extremities although he perceives numbness down the lateral upper arm to the elbow.  MSK:  LEFT SHOULDER  Inspection:    no atrophy  Palpation:  Nontender about the AC joint, anterior capsule, proximal biceps tendon and supraspinatus insertion. Remainder of bony and tendinous landmarks are nontender.  Active Range of Motion:     Abduction 1800, FF 1800, , IR normal.    Strength:    Scapular plane abduction 5/5,  ER 5/5, IR 5/5, biceps 5/5, triceps 5/5  Special Tests:    Negative: Chaudhry', supraspinatus (empty can), [place]'s and Speed's    CERVICAL SPINE  Inspection:    normal cervical lordosis present, rounded shoulders, forward head posture  Palpation:    Tender about the upper trapezius (left) and medial border of scapula. Otherwise remainder of the landmarks and nontender.  Range of Motion:     Flexion full    Extension full  Strength:    Full strength throughout all neck muscles, Deltoid (C5) 5/5, biceps (C6) 5/5, wrist extension (C6) 5/5, triceps (C7) 5/5, wrist flexion (C7) 5/5  Special Tests:    Positive: Spurling's (left) -along medial border scapula    Independent visualization of the below image:  X-ray left shoulder  Well-preserved glenohumeral and AC joint.  Patent outlet.  Final radiology read pending    Patient's conditions were thoroughly discussed during today's visit with greater than 50% of the visit  spent counseling the patient with total time spent face-to-face with the patient being 20 minutes.    Fidelina Garcia DO [facility]      Again, thank you for allowing me to participate in the care of your patient.        Sincerely,        Fidelina Garcia DO

## 2018-05-11 NOTE — PATIENT INSTRUCTIONS
1. Chronic left shoulder pain    2. Cervicalgia    3. Tobacco abuse    4. Type 1 diabetes mellitus with hyperglycemia (H)      Physical therapy: [facility] - [phone]  Discussed exam - pain appears to be coming from your neck  No concern for rotator cuff tear or tendonitis. No frozen shoulder  Reviewed xray - no arthritis and no finding that would predispose you to arthritis  Congrats on being ready to stop smoking.  Continue to work with Dr. Louis on controlling your blood sugar. Given how high your blood sugar is would not recommend using steroid.    Follow up after 4 - 6 therapy sessions.

## 2018-05-11 NOTE — PROGRESS NOTES
ASSESSMENT & PLAN    1. Chronic left shoulder pain    2. Cervicalgia    3. Tobacco abuse    4. Type 1 diabetes mellitus with hyperglycemia (H)      Physical therapy: [facility] - [phone]  Discussed exam - pain appears to be coming from your neck  No concern for rotator cuff tear or tendonitis. No frozen shoulder  Reviewed xray - no arthritis and no finding that would predispose you to arthritis  Congrats on being ready to stop smoking.  Continue to work with Dr. Louis on controlling your blood sugar. Given how high your blood sugar is would not recommend using steroid.    Follow up after 4 - 6 therapy sessions.     -----    SUBJECTIVE  Ge Medellin is a/an 37 year old Right handed male who is seen in consultation at the request of  Lorelei Louis M.D. for evaluation of left shoulder pain. The patient is seen by themselves.    Onset: 2 month(s) ago. Reports insidious onset without acute precipitating event.  Location of Pain: left posterior shoulder/scapula  Rating of Pain at worst: 7/10  Rating of Pain Currently: 5/10  Worsened by: driving, laying supine, reaching behind back  Better with: nothing  Treatments tried: ice, heat and ibuprofen  Associated symptoms:  denies swelling or warmth - some tingling to elbow  Orthopedic history: NO  Relevant surgical history: NO  Patient Social History: works at     Patient's past medical, surgical, social, and family histories were reviewed today and no changes are noted.    REVIEW OF SYSTEMS:  10 point ROS is negative other than symptoms noted above in HPI, Past Medical History or as stated below  Constitutional: NEGATIVE for fever, chills, change in weight  Skin: NEGATIVE for worrisome rashes, moles or lesions  GI/: NEGATIVE for bowel or bladder changes  Neuro: NEGATIVE for weakness, dizziness or paresthesias    OBJECTIVE:  /76  Ht 6' (1.829 m)  Wt 194 lb (88 kg)  BMI 26.31 kg/m2   General: healthy, alert and in no  distress  HEENT: no scleral icterus or conjunctival erythema  Skin: no suspicious lesions or rash. No jaundice.  CV: regular rhythm by palpation  Resp: normal respiratory effort without conversational dyspnea   Psych: normal mood and affect  Gait: normal steady gait with appropriate coordination and balance  Neuro: normal light touch sensory exam of the bilateral upper extremities although he perceives numbness down the lateral upper arm to the elbow.  MSK:  LEFT SHOULDER  Inspection:    no atrophy  Palpation:  Nontender about the AC joint, anterior capsule, proximal biceps tendon and supraspinatus insertion. Remainder of bony and tendinous landmarks are nontender.  Active Range of Motion:     Abduction 1800, FF 1800, , IR normal.    Strength:    Scapular plane abduction 5/5,  ER 5/5, IR 5/5, biceps 5/5, triceps 5/5  Special Tests:    Negative: Chaudhry', supraspinatus (empty can), [place]'s and Speed's    CERVICAL SPINE  Inspection:    normal cervical lordosis present, rounded shoulders, forward head posture  Palpation:    Tender about the upper trapezius (left) and medial border of scapula. Otherwise remainder of the landmarks and nontender.  Range of Motion:     Flexion full    Extension full  Strength:    Full strength throughout all neck muscles, Deltoid (C5) 5/5, biceps (C6) 5/5, wrist extension (C6) 5/5, triceps (C7) 5/5, wrist flexion (C7) 5/5  Special Tests:    Positive: Spurling's (left) -along medial border scapula    Independent visualization of the below image:  X-ray left shoulder  Well-preserved glenohumeral and AC joint.  Patent outlet.  Final radiology read pending    Patient's conditions were thoroughly discussed during today's visit with greater than 50% of the visit spent counseling the patient with total time spent face-to-face with the patient being 20 minutes.    Fidelina Garcia,  [facility]

## 2018-05-18 NOTE — TELEPHONE ENCOUNTER
Type of outreach:  patient was recently seen for DM f/u, scheduled f/u in August as recommended.   Health Maintenance Due   Topic Date Due     DIABETIC EDUCATION Q2 YEAR  07/22/1982     HIV SCREEN (SYSTEM ASSIGNED)  07/22/1999     DIABETIC EDUCATION Q1 YEAR  06/03/2015     EYE EXAM Q1 YEAR  07/27/2017     Teresa Leroy MA

## 2018-05-21 ENCOUNTER — THERAPY VISIT (OUTPATIENT)
Dept: PHYSICAL THERAPY | Facility: CLINIC | Age: 38
End: 2018-05-21
Payer: COMMERCIAL

## 2018-05-21 DIAGNOSIS — M54.2 CERVICALGIA: Primary | ICD-10-CM

## 2018-05-21 DIAGNOSIS — M25.512 LEFT SHOULDER PAIN: ICD-10-CM

## 2018-05-21 PROCEDURE — 97110 THERAPEUTIC EXERCISES: CPT | Mod: GP | Performed by: PHYSICAL THERAPIST

## 2018-05-21 PROCEDURE — 97161 PT EVAL LOW COMPLEX 20 MIN: CPT | Mod: GP | Performed by: PHYSICAL THERAPIST

## 2018-05-21 NOTE — PROGRESS NOTES
[facility] Initial Evaluation  Ge Medellin is a 37 year old  male referred to physical therapy by Dr. Garcia for treatment of neck/left shoulder pain with Precautions/Restrictions/MD instructions eval and treat     Physical Therapy Initial Evaluation: Subjective History      Injury/Condition Details:  Presenting Complaint Neck and left shoulder pain with radiating pain into upper arm   Onset Timing/Date March 2018   Mechanism Insidious      Symptom Behavior Details    Primary Pain Symptoms Location: Left sided neck and shoulder pain with radiating symptoms into arm  Quality: Sharp, numbness/tingling   Frequency: Intetmittent   Worst Pain 8/10   Best Pain 0/10   Symptom Provocators Right cervical rotation   Symptom Relievers Medication, heat   Time of day dependent? Worse during the day   Recent symptom change? None      Prior Testing/Intervention for current condition:  Prior Tests X-ray of left shoulder indicating:  IMPRESSION: No acute or chronic bony abnormality. Distal acromial morphology is type II and humeral acromial distance appears adequate.   Prior Treatment None      Lifestyle & General Medical History:  General Health Reported by Patient good   Employment [employer]   Orthopaedic history None   Notable medical history Diabetes, smoking, numbness/tingling, pain at night/rest       HPI                    Objective:  Standing Alignment:    Cervical/Thoracic:  Forward head  Shoulder/UE:  Rounded shoulders                                  Cervical/Thoracic Evaluation    AROM:  AROM Cervical:    Flexion:            75%, moderate pain  Extension:       75%, mild pain  Rotation:         Left: 100%, painfree     Right: 100%, painfree  Side Bend:      Left: 75%, mild pain     Right:  75%, mild pain      Headaches: none  Cervical Myotomes:  normal                    Neural Tension:    Left side:  Median positive.  Left side:  Radial and Ulnar  negative.            Spinal Segmental Conclusions:     Level:  Hypo at C6, C7, T1 and T2                                                General     ROS    Assessment/Plan:    Patient is a 37 year old male with cervical and left side shoulder complaints.    Patient has the following significant findings with corresponding treatment plan.                Diagnosis 1:  Neck pain  Pain -  manual therapy, splint/taping/bracing/orthotics, self management, education, directional preference exercise and home program  Decreased ROM/flexibility - manual therapy, therapeutic exercise, therapeutic activity and home program  Decreased joint mobility - manual therapy, therapeutic exercise, therapeutic activity and home program  Decreased proprioception - neuro re-education, therapeutic activities and home program  Impaired muscle performance - neuro re-education and home program  Diagnosis 2:  Left shoulder pain   Pain -  manual therapy, splint/taping/bracing/orthotics, self management, education and home program  Decreased ROM/flexibility - manual therapy, therapeutic exercise, therapeutic activity and home program  Decreased strength - therapeutic exercise, therapeutic activities and home program  Impaired muscle performance - neuro re-education and home program    Therapy Evaluation Codes:   1) History comprised of:   Personal factors that impact the plan of care:      None.    Comorbidity factors that impact the plan of care are:      Diabetes, Numbness/tingling, Pain at night/rest and Smoking.     Medications impacting care: High blood pressure.  2) Examination of Body Systems comprised of:   Body structures and functions that impact the plan of care:      Cervical spine and Shoulder.   Activity limitations that impact the plan of care are:      Driving, Lifting, Working and reaching.  3) Clinical presentation characteristics are:   Stable/Uncomplicated.  4) Decision-Making    Low complexity using standardized patient assessment instrument and/or measureable assessment of  functional outcome.  Cumulative Therapy Evaluation is: Low complexity.    Previous and current functional limitations:  (See Goal Flow Sheet for this information)    Short term and Long term goals: (See Goal Flow Sheet for this information)     Communication ability:  Patient appears to be able to clearly communicate and understand verbal and written communication and follow directions correctly.  Treatment Explanation - The following has been discussed with the patient:   RX ordered/plan of care  Anticipated outcomes  Possible risks and side effects  This patient would benefit from PT intervention to resume normal activities.   Rehab potential is good.    Frequency:  1 X week, once daily  Duration:  for 6 weeks  Discharge Plan:  Achieve all LTG.  Independent in home treatment program.  Reach maximal therapeutic benefit.    Please refer to the daily flowsheet for treatment today, total treatment time and time spent performing 1:1 timed codes.

## 2018-06-01 ENCOUNTER — THERAPY VISIT (OUTPATIENT)
Dept: PHYSICAL THERAPY | Facility: CLINIC | Age: 38
End: 2018-06-01
Payer: COMMERCIAL

## 2018-06-01 DIAGNOSIS — M54.2 CERVICALGIA: ICD-10-CM

## 2018-06-01 DIAGNOSIS — M25.512 LEFT SHOULDER PAIN: ICD-10-CM

## 2018-06-01 PROCEDURE — 97012 MECHANICAL TRACTION THERAPY: CPT | Mod: GP | Performed by: PHYSICAL THERAPIST

## 2018-06-01 PROCEDURE — 97140 MANUAL THERAPY 1/> REGIONS: CPT | Mod: GP | Performed by: PHYSICAL THERAPIST

## 2018-06-01 PROCEDURE — 97110 THERAPEUTIC EXERCISES: CPT | Mod: GP | Performed by: PHYSICAL THERAPIST

## 2018-06-08 ENCOUNTER — THERAPY VISIT (OUTPATIENT)
Dept: PHYSICAL THERAPY | Facility: CLINIC | Age: 38
End: 2018-06-08
Payer: COMMERCIAL

## 2018-06-08 DIAGNOSIS — M54.2 CERVICALGIA: ICD-10-CM

## 2018-06-08 DIAGNOSIS — M25.512 LEFT SHOULDER PAIN: ICD-10-CM

## 2018-06-08 PROCEDURE — 97012 MECHANICAL TRACTION THERAPY: CPT | Mod: GP | Performed by: PHYSICAL THERAPIST

## 2018-06-08 PROCEDURE — 97140 MANUAL THERAPY 1/> REGIONS: CPT | Mod: GP | Performed by: PHYSICAL THERAPIST

## 2018-06-08 PROCEDURE — 97112 NEUROMUSCULAR REEDUCATION: CPT | Mod: GP | Performed by: PHYSICAL THERAPIST

## 2018-06-22 ENCOUNTER — THERAPY VISIT (OUTPATIENT)
Dept: PHYSICAL THERAPY | Facility: CLINIC | Age: 38
End: 2018-06-22
Payer: COMMERCIAL

## 2018-06-22 DIAGNOSIS — M54.2 CERVICALGIA: ICD-10-CM

## 2018-06-22 DIAGNOSIS — M25.512 LEFT SHOULDER PAIN: ICD-10-CM

## 2018-06-22 PROCEDURE — 97012 MECHANICAL TRACTION THERAPY: CPT | Mod: GP | Performed by: PHYSICAL THERAPIST

## 2018-06-22 PROCEDURE — 97110 THERAPEUTIC EXERCISES: CPT | Mod: GP | Performed by: PHYSICAL THERAPIST

## 2018-07-31 DIAGNOSIS — E10.65 TYPE 1 DIABETES MELLITUS WITH HYPERGLYCEMIA (H): ICD-10-CM

## 2018-07-31 DIAGNOSIS — R80.9 TYPE 1 DIABETES MELLITUS WITH MICROALBUMINURIA (H): ICD-10-CM

## 2018-07-31 DIAGNOSIS — E10.29 TYPE 1 DIABETES MELLITUS WITH MICROALBUMINURIA (H): ICD-10-CM

## 2018-07-31 LAB
ANION GAP SERPL CALCULATED.3IONS-SCNC: 5 MMOL/L (ref 3–14)
BUN SERPL-MCNC: 16 MG/DL (ref 7–30)
CALCIUM SERPL-MCNC: 9.4 MG/DL (ref 8.5–10.1)
CHLORIDE SERPL-SCNC: 105 MMOL/L (ref 94–109)
CO2 SERPL-SCNC: 30 MMOL/L (ref 20–32)
CREAT SERPL-MCNC: 0.81 MG/DL (ref 0.66–1.25)
GFR SERPL CREATININE-BSD FRML MDRD: >90 ML/MIN/1.7M2
GLUCOSE SERPL-MCNC: 112 MG/DL (ref 70–99)
HBA1C MFR BLD: 8.7 % (ref 0–5.6)
POTASSIUM SERPL-SCNC: 4.4 MMOL/L (ref 3.4–5.3)
SODIUM SERPL-SCNC: 140 MMOL/L (ref 133–144)

## 2018-07-31 PROCEDURE — 83036 HEMOGLOBIN GLYCOSYLATED A1C: CPT | Performed by: PEDIATRICS

## 2018-07-31 PROCEDURE — 36415 COLL VENOUS BLD VENIPUNCTURE: CPT | Performed by: PEDIATRICS

## 2018-07-31 PROCEDURE — 80048 BASIC METABOLIC PNL TOTAL CA: CPT | Performed by: PEDIATRICS

## 2018-08-06 ENCOUNTER — OFFICE VISIT (OUTPATIENT)
Dept: PEDIATRICS | Facility: CLINIC | Age: 38
End: 2018-08-06
Payer: COMMERCIAL

## 2018-08-06 VITALS
HEART RATE: 78 BPM | TEMPERATURE: 98.8 F | OXYGEN SATURATION: 99 % | SYSTOLIC BLOOD PRESSURE: 116 MMHG | WEIGHT: 202 LBS | BODY MASS INDEX: 27.36 KG/M2 | HEIGHT: 72 IN | DIASTOLIC BLOOD PRESSURE: 74 MMHG

## 2018-08-06 DIAGNOSIS — E10.29 TYPE 1 DIABETES MELLITUS WITH MICROALBUMINURIA (H): ICD-10-CM

## 2018-08-06 DIAGNOSIS — R80.9 TYPE 1 DIABETES MELLITUS WITH MICROALBUMINURIA (H): ICD-10-CM

## 2018-08-06 DIAGNOSIS — Z72.0 TOBACCO ABUSE: ICD-10-CM

## 2018-08-06 DIAGNOSIS — E10.65 TYPE 1 DIABETES MELLITUS WITH HYPERGLYCEMIA (H): Primary | ICD-10-CM

## 2018-08-06 DIAGNOSIS — E78.5 HYPERLIPIDEMIA LDL GOAL <100: ICD-10-CM

## 2018-08-06 PROCEDURE — 99214 OFFICE O/P EST MOD 30 MIN: CPT | Performed by: PEDIATRICS

## 2018-08-06 RX ORDER — SIMVASTATIN 40 MG
40 TABLET ORAL AT BEDTIME
Qty: 90 TABLET | Refills: 3 | Status: SHIPPED | OUTPATIENT
Start: 2018-08-06 | End: 2019-06-21

## 2018-08-06 RX ORDER — LISINOPRIL 2.5 MG/1
2.5 TABLET ORAL DAILY
Qty: 90 TABLET | Refills: 3 | Status: SHIPPED | OUTPATIENT
Start: 2018-08-06 | End: 2019-06-21

## 2018-08-06 NOTE — PATIENT INSTRUCTIONS
Keep up the great work on cutting down on smoking    Call for visit with endocrinology - [phone] - it will be a little bit before you can see Dr Reyna.    Continue your current medications.

## 2018-08-06 NOTE — PROGRESS NOTES
SUBJECTIVE:   Ge Medellin is a 38 year old male who presents to clinic today for the following health issues:      Diabetes Follow-up      Patient is checking blood sugars: 14 days average 213    Diabetic concerns: None     Symptoms of hypoglycemia (low blood sugar): none     Paresthesias (numbness or burning in feet) or sores: No     Date of last diabetic eye exam: patient due - informed     No dangerous lows.   No neuropathy symptoms.    At last visit, increased tresiba to 38 units daily and patient had planned to be more diligent about carb counting.    10 mile hikes while camping - tolerated well.    Tobacco abuse - working on cutting down - has been smoking more at work.  Best day - 3 cigs/day.  Worst day - 10-15cigs/day.    No cardiac symptoms.      BP Readings from Last 2 Encounters:   08/06/18 116/74   05/11/18 120/76     Hemoglobin A1C (%)   Date Value   07/31/2018 8.7 (H)   05/07/2018 9.1 (H)     LDL Cholesterol Calculated (mg/dL)   Date Value   12/11/2017 69   10/05/2016 90       Diabetes Management Resources  Hyperlipidemia Follow-Up      Rate your low fat/cholesterol diet?: fair    Taking statin?  Yes, no muscle aches from statin    Other lipid medications/supplements?:  none      Amount of exercise or physical activity: None    Problems taking medications regularly: No    Medication side effects: none    Diet: low fat/cholesterol and diabetic        Left shoulder pain - was actually neck related pain - has improved with physical therapy.      Problem list and histories reviewed & adjusted, as indicated.  Additional history: as documented    Reviewed and updated as needed this visit by clinical staff  Tobacco  Allergies  Meds  Med Hx  Surg Hx  Fam Hx  Soc Hx      Reviewed and updated as needed this visit by Provider  Tobacco         ROS:  Constitutional, HEENT, cardiovascular, pulmonary, gi and msk systems are negative, except as otherwise noted.    OBJECTIVE:     /74  Pulse 78  Temp 98.8   F (37.1  C) (Tympanic)  Ht 6' (1.829 m)  Wt 202 lb (91.6 kg)  SpO2 99%  BMI 27.4 kg/m2  Body mass index is 27.4 kg/(m^2).  GENERAL: healthy, alert and no distress  RESP: lungs clear to auscultation - no rales, rhonchi or wheezes  CV: regular rate and rhythm, normal S1 S2, no S3 or S4, no murmur, click or rub, no peripheral edema and peripheral pulses strong  ABDOMEN: no lipodystrophy    Diagnostic Test Results:  Results for orders placed or performed in visit on 07/31/18   Hemoglobin A1c   Result Value Ref Range    Hemoglobin A1C 8.7 (H) 0 - 5.6 %   Basic metabolic panel   Result Value Ref Range    Sodium 140 133 - 144 mmol/L    Potassium 4.4 3.4 - 5.3 mmol/L    Chloride 105 94 - 109 mmol/L    Carbon Dioxide 30 20 - 32 mmol/L    Anion Gap 5 3 - 14 mmol/L    Glucose 112 (H) 70 - 99 mg/dL    Urea Nitrogen 16 7 - 30 mg/dL    Creatinine 0.81 0.66 - 1.25 mg/dL    GFR Estimate >90 >60 mL/min/1.7m2    GFR Estimate If Black >90 >60 mL/min/1.7m2    Calcium 9.4 8.5 - 10.1 mg/dL       ASSESSMENT/PLAN:         ICD-10-CM    1. Type 1 diabetes mellitus with hyperglycemia (H) E10.65 ENDOCRINOLOGY ADULT REFERRAL     lisinopril (PRINIVIL/ZESTRIL) 2.5 MG tablet     simvastatin (ZOCOR) 40 MG tablet    While we have seen improvement over the last 2 years, have not been able to maintain good control.  Recommended endocrinology visit for further evaluation and management - patient agreeable.      Continue current medications for now.     2. Type 1 diabetes mellitus with microalbuminuria (H) E10.29 lisinopril (PRINIVIL/ZESTRIL) 2.5 MG tablet    R80.9 simvastatin (ZOCOR) 40 MG tablet   3. Hyperlipidemia LDL goal <100 E78.5 simvastatin (ZOCOR) 40 MG tablet   4. Tobacco abuse Z72.0 Patient has cut down significantly - encouraged to continue.       See Patient Instructions    Lorelei Louis MD  [facility]

## 2018-08-06 NOTE — MR AVS SNAPSHOT
After Visit Summary   8/6/2018    Ge Medellin    MRN: [id number]           Patient Information     Date Of Birth          1980        Visit Information        Provider Department      8/6/2018 7:00 AM Lorelei Louis MD [facility]        Today's Diagnoses     Type 1 diabetes mellitus with hyperglycemia (H)    -  1    Type 1 diabetes mellitus with microalbuminuria (H)        Hyperlipidemia LDL goal <100          Care Instructions    Keep up the great work on cutting down on smoking    Call for visit with endocrinology - [phone] - it will be a little bit before you can see Dr Reyna.    Continue your current medications.          Follow-ups after your visit        Additional Services     ENDOCRINOLOGY ADULT REFERRAL       Your provider has referred you to: [facility]: [facility] [phone]   [email or web address]      Please be aware that coverage of these services is subject to the terms and limitations of your health insurance plan.  Call member services at your health plan with any benefit or coverage questions.      Please bring the following to your appointment:    >>   Any x-rays, CTs or MRIs which have been performed.  Contact the facility where they were done to arrange for  prior to your scheduled appointment.    >>   List of current medications   >>   This referral request   >>   Any documents/labs given to you for this referral                  Who to contact     If you have questions or need follow up information about today's clinic visit or your schedule please contact [facility] directly at [phone].  Normal or non-critical lab and imaging results will be communicated to you by MyChart, letter or phone within 4 business days after the clinic has received the results. If you do not hear from us within 7 days, please contact the clinic through MyChart or phone. If you have a critical or abnormal lab result,  we will notify you by phone as soon as possible.  Submit refill requests through [employer] or call your pharmacy and they will forward the refill request to us. Please allow 3 business days for your refill to be completed.          Additional Information About Your Visit        [employer] Information     [employer] gives you secure access to your electronic health record. If you see a primary care provider, you can also send messages to your care team and make appointments. If you have questions, please call your primary care clinic.  If you do not have a primary care provider, please call [phone] and they will assist you.        Care EveryWhere ID     This is your Care EveryWhere ID. This could be used by other organizations to access your [place] medical records  [id number]        Your Vitals Were     Pulse Temperature Height Pulse Oximetry BMI (Body Mass Index)       78 98.8  F (37.1  C) (Tympanic) 6' (1.829 m) 99% 27.4 kg/m2        Blood Pressure from Last 3 Encounters:   08/06/18 116/74   05/11/18 120/76   05/07/18 118/72    Weight from Last 3 Encounters:   08/06/18 202 lb (91.6 kg)   05/11/18 194 lb (88 kg)   05/07/18 194 lb (88 kg)              We Performed the Following     ENDOCRINOLOGY ADULT REFERRAL          Where to get your medicines      These medications were sent to [facility] [address] [place] [address] [facility]  [address] [facility] [address]     Phone:  [phone]     lisinopril 2.5 MG tablet    simvastatin 40 MG tablet          Primary Care Provider Office Phone # Fax #    Lorelei Louis [phone] [address] [place] [address]        Equal Access to Services     [facility] AH: Hadii aad tal hadbrendao Solauren, waaxda luqadaha, qaybta kaalmada adeegyada, danny dias. So [facility] [phone].    ATENCIÓN: Si habla español, tiene a ramsey disposición servicios gratuitos de asistencia lingüística. Deangeloame  al [phone].    We comply with applicable federal civil rights laws and [place] laws. We do not discriminate on the basis of race, color, national origin, age, disability, sex, sexual orientation, or gender identity.            Thank you!     Thank you for choosing [facility]  for your care. Our goal is always to provide you with excellent care. Hearing back from our patients is one way we can continue to improve our services. Please take a few minutes to complete the written survey that you may receive in the mail after your visit with us. Thank you!             Your Updated Medication List - Protect others around you: Learn how to safely use, store and throw away your medicines at www.disposemymeds.org.          This list is accurate as of 8/6/18  7:25 AM.  Always use your most recent med list.                   Brand Name Dispense Instructions for use Diagnosis    ASPIRIN NOT PRESCRIBED    INTENTIONAL    0 each    continuous prn Antiplatelet medication not prescribed intentionally due to Refusal by patient        * blood glucose monitoring test strip    no brand specified    450 strip    5 times daily. Dispense one touch ultra strips per insurance coverage5 times daily. Dispense one touch ultra strips per insurance coverage    Type 1 diabetes, HbA1c goal < 7% (H)       * blood glucose monitoring test strip    no brand specified    300 strip    1 strip by In Vitro route 4 times daily    Type 1 diabetes, HbA1c goal < 7% (H)       * blood glucose monitoring test strip    ONETOUCH VERIO IQ    400 each    Use to test blood sugar 4 times daily or as directed.    Type 1 diabetes mellitus with nephropathy (H)       * DIABETIC STERILE LANCETS device     1 Box    1 Device daily.    Type 1 diabetes, HbA1c goal < 7% (H)       * blood glucose monitoring lancets     2 Box    Use to test blood sugars 4 times daily or as directed.    Type 1 diabetes mellitus with nephropathy (H)       insulin lispro 100 UNIT/ML  injection    HumaLOG PEN    30 mL    Inject Subcutaneous 3 times daily (before meals). 1:10 carb correction with every meal - estimated use 30 units    Type 1 diabetes mellitus with microalbuminuria (H)       lisinopril 2.5 MG tablet    PRINIVIL/Zestril    90 tablet    Take 1 tablet (2.5 mg) by mouth daily    Type 1 diabetes mellitus with hyperglycemia (H), Type 1 diabetes mellitus with microalbuminuria (H)       simvastatin 40 MG tablet    ZOCOR    90 tablet    Take 1 tablet (40 mg) by mouth At Bedtime    Hyperlipidemia LDL goal <100, Type 1 diabetes mellitus with hyperglycemia (H), Type 1 diabetes mellitus with microalbuminuria (H)       TRESIBA FLEXTOUCH 100 UNIT/ML pen   Generic drug:  insulin degludec     45 mL    INJECT 38 UNITS SUBCUTANEOUS DAILY    Type 1 diabetes mellitus with hyperglycemia (H), Type 1 diabetes mellitus with microalbuminuria (H)       * Notice:  This list has 5 medication(s) that are the same as other medications prescribed for you. Read the directions carefully, and ask your doctor or other care provider to review them with you.

## 2018-10-02 ENCOUNTER — OFFICE VISIT (OUTPATIENT)
Dept: ENDOCRINOLOGY | Facility: CLINIC | Age: 38
End: 2018-10-02
Payer: COMMERCIAL

## 2018-10-02 VITALS
OXYGEN SATURATION: 97 % | HEIGHT: 72 IN | WEIGHT: 200.8 LBS | DIASTOLIC BLOOD PRESSURE: 81 MMHG | HEART RATE: 74 BPM | SYSTOLIC BLOOD PRESSURE: 122 MMHG | TEMPERATURE: 98.3 F | BODY MASS INDEX: 27.2 KG/M2

## 2018-10-02 DIAGNOSIS — E10.29 TYPE 1 DIABETES MELLITUS WITH MICROALBUMINURIA (H): ICD-10-CM

## 2018-10-02 DIAGNOSIS — E10.65 TYPE 1 DIABETES MELLITUS WITH HYPERGLYCEMIA (H): ICD-10-CM

## 2018-10-02 DIAGNOSIS — E10.21 TYPE 1 DIABETES MELLITUS WITH NEPHROPATHY (H): Primary | ICD-10-CM

## 2018-10-02 DIAGNOSIS — R80.9 TYPE 1 DIABETES MELLITUS WITH MICROALBUMINURIA (H): ICD-10-CM

## 2018-10-02 LAB — HBA1C MFR BLD: 8.5 % (ref 0–5.6)

## 2018-10-02 PROCEDURE — 83516 IMMUNOASSAY NONANTIBODY: CPT | Mod: 90 | Performed by: INTERNAL MEDICINE

## 2018-10-02 PROCEDURE — 36415 COLL VENOUS BLD VENIPUNCTURE: CPT | Performed by: INTERNAL MEDICINE

## 2018-10-02 PROCEDURE — 83036 HEMOGLOBIN GLYCOSYLATED A1C: CPT | Performed by: INTERNAL MEDICINE

## 2018-10-02 PROCEDURE — 99244 OFF/OP CNSLTJ NEW/EST MOD 40: CPT | Performed by: INTERNAL MEDICINE

## 2018-10-02 PROCEDURE — 99000 SPECIMEN HANDLING OFFICE-LAB: CPT | Performed by: INTERNAL MEDICINE

## 2018-10-02 PROCEDURE — 84681 ASSAY OF C-PEPTIDE: CPT | Performed by: INTERNAL MEDICINE

## 2018-10-02 NOTE — MR AVS SNAPSHOT
After Visit Summary   10/2/2018    Ge Medellin    MRN: [id number]           Patient Information     Date Of Birth          1980        Visit Information        Provider Department      10/2/2018 3:00 PM Marline Reyna MD [facility]        Today's Diagnoses     Type 1 diabetes mellitus with nephropathy, goal A1C < 7%    -  1    Type 1 diabetes mellitus with hyperglycemia (H)        Type 1 diabetes mellitus with microalbuminuria (H)          Care Instructions    [facility]   Dr Reyna, Endocrinology Department      [facility]   [address]  [place] [address]  Appointment [phone]  Fax: [phone]  [place]: Monday and Tuesday         [place] [address]  [place] [address]  Appointment [phone]  Fax: [phone]  [place]: Wednesday and Thursday            Labs today  Increase Tresiba to 40 units  Continue Humalog at current dose  Your provider has referred you to Diabetes Education: For all [facility]:  Phone [phone]; Fax [phone]  Please call and make the appointment.    Recommend checking blood sugars before meals and at bedtime.    If Blood glucose are low more often-> 2-3 times/week- give us a call.  The patient is advised to Make better food choices: reduce carbs, Reduce portion size, weight loss and exercise 3-4 times a week.  Discussed hypoglycemia signs and symptoms as well as management in detail.                Follow-ups after your visit        Additional Services     DIABETES EDUCATOR REFERRAL       Your provider has referred you to Diabetes Education: For all [facility]:  Phone [phone]; Fax [phone]    This is a Previous Diagnosis     Type of diabetes is Type 1   Medicare covers: 10 hours of initial DSMT in 12 month period from the time of first visit, plus 2 hours of follow-up DSMT  annually, and additional hours as requested for insulin training.         Diabetes Co-Morbidities: dyslipidemia and hypertension               A1C Goal:  <7.0       A1C is: Lab Results       Component                Value               Date                       A1C                      8.7                 07/31/2018              MEDICAL NUTRITION THERAPY (MNT) for Diabetes    Medical Nutrition Therapy with a Registered Dietitian can be provided in coordination with Diabetes Self-Management Training to assist in achieving optimal diabetes management.    MNT Type and Hours: Previous diagnosis: Annual follow-up MNT - 2 hours                       Medicare will cover: 3 hours initial MNT in 12 month period after first visit, plus 2 hours of follow-up MNT annually                                                          A1C is: Lab Results       Component                Value               Date                       A1C                      8.7                 07/31/2018            If an urgent visit is needed or A1C is above 12, Care Team to call the diabetes education team at [phone] or send a message to the diabetes education pool (P DIAB ED-PATIENT CARE).    Diabetes education focus: Insulin pump therapy Current pump user and Pre-pump start education      Education needs: None                                                                                                                                                      Please be aware that coverage of these services is subject to the terms and limitations of your health insurance plan.  Call member services at your health plan to determine Diabetes Self-Management Training benefits and ask which blood glucose monitor brands are covered by your plan.      Please bring the following to your appointment:    -   List of current medications   -   List of blood glucose monitor brands that are covered by your insurance plan  -   Blood glucose monitor and log  book  -   Food records for the 3 days prior to your visit      The Certified Diabetes Educator may make diabetes medication adjustments per  the CDE Protocol and Collaborative Practice Agreement.                  Who to contact     If you have questions or need follow up information about today's clinic visit or your schedule please contact [facility] directly at [phone].  Normal or non-critical lab and imaging results will be communicated to you by MyChart, letter or phone within 4 business days after the clinic has received the results. If you do not hear from us within 7 days, please contact the clinic through [employer] or phone. If you have a critical or abnormal lab result, we will notify you by phone as soon as possible.  Submit refill requests through Ch[employer] or call your pharmacy and they will forward the refill request to us. Please allow 3 business days for your refill to be completed.          Additional Information About Your Visit        Kleek[employer] Information     Chorus gives you secure access to your electronic health record. If you see a primary care provider, you can also send messages to your care team and make appointments. If you have questions, please call your primary care clinic.  If you do not have a primary care provider, please call [phone] and they will assist you.        Care EveryWhere ID     This is your Care EveryWhere ID. This could be used by other organizations to access your [place] medical records  [id number]        Your Vitals Were     Pulse Temperature Height Pulse Oximetry BMI (Body Mass Index)       74 98.3  F (36.8  C) (Oral) 1.829 m (6') 97% 27.23 kg/m2        Blood Pressure from Last 3 Encounters:   10/02/18 122/81   08/06/18 116/74   05/11/18 120/76    Weight from Last 3 Encounters:   10/02/18 91.1 kg (200 lb 12.8 oz)   08/06/18 91.6 kg (202 lb)   05/11/18 88 kg (194 lb)              We Performed the Following     C-peptide     DIABETES EDUCATOR  REFERRAL     Glutamic acid decarboxylase antibody     Hemoglobin A1c          Today's Medication Changes          These changes are accurate as of 10/2/18  3:22 PM.  If you have any questions, ask your nurse or doctor.               These medicines have changed or have updated prescriptions.        Dose/Directions    insulin degludec 100 UNIT/ML pen   Commonly known as:  TRESIBA FLEXTOUCH   This may have changed:  additional instructions   Used for:  Type 1 diabetes mellitus with hyperglycemia (H), Type 1 diabetes mellitus with microalbuminuria (H)   Changed by:  Marline Reyna MD        INJECT 40 UNITS SUBCUTANEOUS DAILY   Quantity:  45 mL   Refills:  3            Where to get your medicines      Some of these will need a paper prescription and others can be bought over the counter.  Ask your nurse if you have questions.     You don't need a prescription for these medications     insulin degludec 100 UNIT/ML pen                Primary Care Provider Office Phone # Fax #    oLrelei Louis [phone] [address] [place] [address]        Equal Access to Services     [facility]: Hadii aad ku hadasho Soomaali, waaxda luqadaha, qaybta kaalmada adeegyada, waxay charissain hayalyson hyman . So [facility] [phone].    ATENCIÓN: Si habla español, tiene a ramsey disposición servicios gratuitos de asistencia lingüística. Llame al [phone].    We comply with applicable federal civil rights laws and [place] laws. We do not discriminate on the basis of race, color, national origin, age, disability, sex, sexual orientation, or gender identity.            Thank you!     Thank you for choosing [facility]  for your care. Our goal is always to provide you with excellent care. Hearing back from our patients is one way we can continue to improve our services. Please take a few minutes to complete the written survey that you may receive in the mail after your visit  with us. Thank you!             Your Updated Medication List - Protect others around you: Learn how to safely use, store and throw away your medicines at www.disposemymeds.org.          This list is accurate as of 10/2/18  3:22 PM.  Always use your most recent med list.                   Brand Name Dispense Instructions for use Diagnosis    ASPIRIN NOT PRESCRIBED    INTENTIONAL    0 each    continuous prn Antiplatelet medication not prescribed intentionally due to Refusal by patient        * blood glucose monitoring test strip    no brand specified    450 strip    5 times daily. Dispense one touch ultra strips per insurance coverage5 times daily. Dispense one touch ultra strips per insurance coverage    Type 1 diabetes, HbA1c goal < 7% (H)       * blood glucose monitoring test strip    no brand specified    300 strip    1 strip by In Vitro route 4 times daily    Type 1 diabetes, HbA1c goal < 7% (H)       * blood glucose monitoring test strip    ONETOUCH VERIO IQ    400 each    Use to test blood sugar 4 times daily or as directed.    Type 1 diabetes mellitus with nephropathy (H)       * DIABETIC STERILE LANCETS device     1 Box    1 Device daily.    Type 1 diabetes, HbA1c goal < 7% (H)       * blood glucose monitoring lancets     2 Box    Use to test blood sugars 4 times daily or as directed.    Type 1 diabetes mellitus with nephropathy (H)       insulin degludec 100 UNIT/ML pen    TRESIBA FLEXTOUCH    45 mL    INJECT 40 UNITS SUBCUTANEOUS DAILY    Type 1 diabetes mellitus with hyperglycemia (H), Type 1 diabetes mellitus with microalbuminuria (H)       insulin lispro 100 UNIT/ML injection    HumaLOG PEN    30 mL    Inject Subcutaneous 3 times daily (before meals). 1:10 carb correction with every meal - estimated use 30 units    Type 1 diabetes mellitus with microalbuminuria (H)       lisinopril 2.5 MG tablet    PRINIVIL/Zestril    90 tablet    Take 1 tablet (2.5 mg) by mouth daily    Type 1 diabetes mellitus with  hyperglycemia (H), Type 1 diabetes mellitus with microalbuminuria (H)       simvastatin 40 MG tablet    ZOCOR    90 tablet    Take 1 tablet (40 mg) by mouth At Bedtime    Hyperlipidemia LDL goal <100, Type 1 diabetes mellitus with hyperglycemia (H), Type 1 diabetes mellitus with microalbuminuria (H)       * Notice:  This list has 5 medication(s) that are the same as other medications prescribed for you. Read the directions carefully, and ask your doctor or other care provider to review them with you.

## 2018-10-02 NOTE — PROGRESS NOTES
Endocrinology Clinic Note:  Name: Ge Medellin  Seen in consultation with Lorelei Louis for Diabetes.  HPI:  Ge Medellin is a 38 year old male who presents for the evaluation/management of type 1 diabetes.  Was seen by  in past. But lost follow-up.  He is interested in insulin pump.     1. Type 1 DM:  Orginally diagnosed: 2007  Hospitalization for DKA: no    Current Regimen:   yes:     Diabetes Medication(s)     Insulin Sig    insulin degludec (TRESIBA FLEXTOUCH) 100 UNIT/ML pen INJECT 38 UNITS SUBCUTANEOUS DAILY    insulin lispro (HUMALOG PEN) 100 UNIT/ML injection Inject Subcutaneous 3 times daily (before meals). 1:10 carb correction with every meal - estimated use 30 units          BS checks: 2-3 times a day  Average Meter Download: 241.  Blood sugars are variable ranging from .  Low blood sugar episode mostly in the setting of taking higher dose of insulin  He is able to feel symptoms of hypoglycemia  Exercise:  Last A1c: 8.7%  Fixed meal pattern: yes  Patient counting carbs: yes  Using PUMP: no      DM Complications:   Complications:   Diabetes Complications  Description / Detail    Diabetic Retinopathy  No   CAD / PAD  No   Neuropathy  No   Nephropathy / Microalbuminuria  No  Lab Results   Component Value Date    UMALCR 11.61 02/05/2018         Gastroparesis  No   Hypoglycemia [place]  No          2. Hypertension: Blood Pressure today:   BP Readings from Last 3 Encounters:   10/02/18 122/81   08/06/18 116/74   05/11/18 120/76   on medication  3. Hyperlipidemia:  On medication    PMH/PSH:  Past Medical History:   Diagnosis Date     Diabetes 2008    type I     Past Surgical History:   Procedure Laterality Date     none       Family Hx:  Family History   Problem Relation Age of Onset     Hypertension Father      C.A.D. Father      9 stents - 1st one at 48     Cancer Paternal Grandmother      lung cancer     Diabetes No family hx of            DM2:             Social Hx:  Social History      Social History     Marital status:      Spouse name: N/A     Number of children: N/A     Years of education: N/A     Occupational History     Not on file.     Social History Main Topics     Smoking status: Current Every Day Smoker     Years: 14.00     Types: Cigarettes     Last attempt to quit: 8/5/2010     Smokeless tobacco: Never Used     Alcohol use No     Drug use: No     Sexual activity: Yes     Partners: Female     Other Topics Concern     Parent/Sibling W/ Cabg, Mi Or Angioplasty Before 65f 55m? Yes     Social History Narrative    Previously lived in [place] - moved to [place] in 2010.  Works for [employer] in [place] - works overnights.  Heavy lifting and stocking work.   2012          MEDICATIONS:  has a current medication list which includes the following prescription(s): aspirin not prescribed, blood glucose monitoring, blood glucose monitoring, blood glucose monitoring, diabetic sterile lancets, blood glucose monitoring, insulin degludec, insulin lispro, lisinopril, and simvastatin.    ROS     ROS: 10 point ROS neg other than the symptoms noted above in the HPI.    Physical Exam   VS: /81 (BP Location: Right arm, Patient Position: Chair, Cuff Size: Adult Regular)  Pulse 74  Temp 98.3  F (36.8  C) (Oral)  Ht 1.829 m (6')  Wt 91.1 kg (200 lb 12.8 oz)  SpO2 97%  BMI 27.23 kg/m2  GENERAL: AXOX3, NAD, well dressed, answering questions appropriately, appears stated age.  HEENT: No exopthalmous, no proptosis, EOMI, no lig lag, no retraction  NECK: Thyroid normal in size, non tender, no nodules were palpated.  CV: RRR  LUNGS: CTAB  ABDOMEN: +BS  NEUROLOGY: CN grossly intact, no tremors  PSYCH: normal affect and mood      LABS:  A1c:  Lab Results   Component Value Date    A1C 8.7 07/31/2018    A1C 9.1 05/07/2018    A1C 9.0 02/05/2018    A1C 7.7 07/03/2017    A1C 8.3 01/09/2017         BMP:  Last Basic Metabolic Panel:  Lab Results   Component Value Date     07/31/2018      Lab Results    Component Value Date    POTASSIUM 4.4 07/31/2018     Lab Results   Component Value Date    CHLORIDE 105 07/31/2018     Lab Results   Component Value Date    CHNADA 9.4 07/31/2018     Lab Results   Component Value Date    CO2 30 07/31/2018     Lab Results   Component Value Date    BUN 16 07/31/2018     Lab Results   Component Value Date    CR 0.81 07/31/2018     Lab Results   Component Value Date     07/31/2018         Urine Micro:  Lab Results   Component Value Date    MICROL 7 02/05/2018     No results found for: MICROALBUMIN      LFTs/Lipids:  Recent Labs   Lab Test  12/11/17   0835  10/05/16   0710  10/31/15   0923  12/27/14   0922   CHOL  144  155  172  138   HDL  60  56  65  47   LDL  69  90  97  78   TRIG  73  45  48  65   CHOLHDLRATIO   --    --   2.6  2.9       Liver Function Studies -   Recent Labs   Lab Test  12/11/17   0835   PROTTOTAL  7.4   ALBUMIN  3.9   BILITOTAL  0.4   ALKPHOS  78   AST  9   ALT  21         TFTs:  Lab Results   Component Value Date    TSH 0.84 02/05/2018           Blood Glucose download and pump data/ Meter reviewed.     All pertinent notes, labs, and images personally reviewed by me.     A/P  Mr.Eric Medellin is a 38 year old here for the evaluation/management of diabetes:    1. DM1 - Under Poor control.  A1c 8.7%.  No known complications from diabetes  Blood sugar data showing variable blood sugar numbers.  Is interested in insulin pump and I think that he can be a good candidate for that  He knows about carbohydrate counting  Plan  Increase tresiba to 40 units/day  Continue Humalog at current dose  Follow-up with diabetic educator for prepump assessment.  I gave him more information and handouts about different insulin pump including Medtronic 670 G pump.  Will get labs as below.  Plan: Hemoglobin A1c, C-peptide, Glutamic acid         decarboxylase antibody, DIABETES EDUCATOR         REFERRAL         Recommend checking blood sugars before meals and at bedtime.    If Blood  glucose are low more often-> 2-3 times/week- give us a call.  The patient is advised to Make better food choices: reduce carbs, Reduce portion size, weight loss and exercise 3-4 times a week.  Discussed hypoglycemia signs and symptoms as well as management in detail.      There is some variability among people, most will usually develop symptoms suggestive of hypoglycemia when blood glucose levels are lowered to the mid 60's. The first set of symptoms are called adrenergic. Patients may experience any of the following nervousness, sweating, intense hunger, trembling, weakness, palpitations, and difficulty speaking. When BS fall below 50 the patient is unable to talk and take oral therapy.  Would recommend Glucagon emergency kit for the patient and education for family and friends around the patient.   The acute management of hypoglycemia involves the rapid delivery of a source of easily absorbed sugar. Regular soda, juice, lifesavers, table sugar, are good options. 15 grams of glucose is the dose that is given, followed by an assessment of symptoms and a blood glucose check if possible. If after 10 minutes there is no improvement, another 10-15 grams should be given. This can be repeated up to three times. The equivalency of 10-15 grams of glucose (approximate servings) are: Four lifesavers, 4 teaspoons of sugar, or 1/2 cup or 4 oz of juice or regular pop.    2. Hypertension - Under Good  control.  Takes lisinopril 2.5 mg      3. Hyperlipidemia - Under Good control. Takes simvastatin 40 mg    4. Prevention  ASA- NA  Smoking- no    Most Recent Immunizations   Administered Date(s) Administered     Influenza (IIV3) PF 10/10/2012     Influenza Intranasal Vaccine 4 valent 09/22/2015     Influenza Vaccine IM 3yrs+ 4 Valent IIV4 08/09/2018     Pneumococcal 23 valent 10/10/2012     TDAP Vaccine (Adacel) 10/10/2012         Follow-up:  follow up in 3 month(s)    Marline Reyna M.D  [facility]  [place]  CC: Lorelei Louis    More than 50% of face to face time spent with Mr. Medellin on counseling / coordinating his care.     All questions were answered.  The patient indicates understanding of the above issues and agrees with the plan set forth.     Disclaimer: This note consists of symbols derived from keyboarding, dictation and/or voice recognition software. As a result, there may be errors in the script that have gone undetected. Please consider this when interpreting information found in this chart.    Addendum to above note and clinic visit:    Labs reviewed.    See result note/telephone encounter.

## 2018-10-02 NOTE — PATIENT INSTRUCTIONS
[facility]   Dr Reyna, Endocrinology Department      [facility]   [address]  [place] [address]  Appointment [phone]  Fax: [phone]  [place]: Monday and Tuesday         [facility]   [address]  [place] [address]  Appointment [phone]  Fax: [phone]  [place]: Wednesday and Thursday            Labs today  Increase Tresiba to 40 units  Continue Humalog at current dose  Your provider has referred you to Diabetes Education: For all [facility]:  Phone [phone]; Fax [phone]  Please call and make the appointment.    Recommend checking blood sugars before meals and at bedtime.    If Blood glucose are low more often-> 2-3 times/week- give us a call.  The patient is advised to Make better food choices: reduce carbs, Reduce portion size, weight loss and exercise 3-4 times a week.  Discussed hypoglycemia signs and symptoms as well as management in detail.

## 2018-10-02 NOTE — NURSING NOTE
ENDOCRINOLOGY INTAKE FORM    Patient Name:  Ge Medellin  :  1980    Is patient Diabetic?   Yes: type 1  Does patient have non-diabetic or other endocrine issues?  Yes: hyperlipidemia    Vitals: /81 (BP Location: Right arm, Patient Position: Chair, Cuff Size: Adult Regular)  Pulse 74  Temp 98.3  F (36.8  C) (Oral)  Ht 1.829 m (6')  Wt 91.1 kg (200 lb 12.8 oz)  SpO2 97%  BMI 27.23 kg/m2  BMI= Body mass index is 27.23 kg/(m^2).    Flu vaccine:  Yes:   Pneumonia vaccine:  Yes: pneumovax only    Smoking and Alcohol use:  Social History   Substance Use Topics     Smoking status: Current Every Day Smoker     Years: 14.00     Types: Cigarettes     Last attempt to quit: 2010     Smokeless tobacco: Never Used     Alcohol use No       Foot Exam: Yes: 19  Eye Exam:  No  Dental Exam:  No  Aspirin Use:  No    Lab Results   Component Value Date    A1C 8.7 2018    A1C 9.1 2018    A1C 9.0 2018    A1C 7.7 2017    A1C 8.3 2017       Lab Results   Component Value Date    MICROL 7 2018     No results found for: MICROALBUMIN    Kelly Rosas CMA

## 2018-10-02 NOTE — LETTER
10/2/2018         RE: Ge Medellin  [address] [place] [address]        Dear Colleague,    Thank you for referring your patient, Ge Medellin, to the [facility]. Please see a copy of my visit note below.    Endocrinology Clinic Note:  Name: Ge Medellin  Seen in consultation with Lorelei Louis for Diabetes.  HPI:  Ge Medellin is a 38 year old male who presents for the evaluation/management of type 1 diabetes.  Was seen by  in past. But lost follow-up.  He is interested in insulin pump.     1. Type 1 DM:  Orginally diagnosed: 2007  Hospitalization for DKA: no    Current Regimen:   yes:     Diabetes Medication(s)     Insulin Sig    insulin degludec (TRESIBA FLEXTOUCH) 100 UNIT/ML pen INJECT 38 UNITS SUBCUTANEOUS DAILY    insulin lispro (HUMALOG PEN) 100 UNIT/ML injection Inject Subcutaneous 3 times daily (before meals). 1:10 carb correction with every meal - estimated use 30 units          BS checks: 2-3 times a day  Average Meter Download: 241.  Blood sugars are variable ranging from .  Low blood sugar episode mostly in the setting of taking higher dose of insulin  He is able to feel symptoms of hypoglycemia  Exercise:  Last A1c: 8.7%  Fixed meal pattern: yes  Patient counting carbs: yes  Using PUMP: no      DM Complications:   Complications:   Diabetes Complications  Description / Detail    Diabetic Retinopathy  No   CAD / PAD  No   Neuropathy  No   Nephropathy / Microalbuminuria  No  Lab Results   Component Value Date    UMALCR 11.61 02/05/2018         Gastroparesis  No   Hypoglycemia [place]  No          2. Hypertension: Blood Pressure today:   BP Readings from Last 3 Encounters:   10/02/18 122/81   08/06/18 116/74   05/11/18 120/76   on medication  3. Hyperlipidemia:  On medication    PMH/PSH:  Past Medical History:   Diagnosis Date     Diabetes 2008    type I     Past Surgical History:   Procedure Laterality Date     none       Family Hx:  Family History   Problem  Relation Age of Onset     Hypertension Father      C.A.D. Father      9 stents - 1st one at 48     Cancer Paternal Grandmother      lung cancer     Diabetes No family hx of            DM2:             Social Hx:  Social History     Social History     Marital status:      Spouse name: N/A     Number of children: N/A     Years of education: N/A     Occupational History     Not on file.     Social History Main Topics     Smoking status: Current Every Day Smoker     Years: 14.00     Types: Cigarettes     Last attempt to quit: 8/5/2010     Smokeless tobacco: Never Used     Alcohol use No     Drug use: No     Sexual activity: Yes     Partners: Female     Other Topics Concern     Parent/Sibling W/ Cabg, Mi Or Angioplasty Before 65f 55m? Yes     Social History Narrative    Previously lived in [place] - moved to [place] in 2010.  Works for [employer] in [employer] - works overnights.  Heavy lifting and stocking work.   2012          MEDICATIONS:  has a current medication list which includes the following prescription(s): aspirin not prescribed, blood glucose monitoring, blood glucose monitoring, blood glucose monitoring, diabetic sterile lancets, blood glucose monitoring, insulin degludec, insulin lispro, lisinopril, and simvastatin.    ROS     ROS: 10 point ROS neg other than the symptoms noted above in the HPI.    Physical Exam   VS: /81 (BP Location: Right arm, Patient Position: Chair, Cuff Size: Adult Regular)  Pulse 74  Temp 98.3  F (36.8  C) (Oral)  Ht 1.829 m (6')  Wt 91.1 kg (200 lb 12.8 oz)  SpO2 97%  BMI 27.23 kg/m2  GENERAL: AXOX3, NAD, well dressed, answering questions appropriately, appears stated age.  HEENT: No exopthalmous, no proptosis, EOMI, no lig lag, no retraction  NECK: Thyroid normal in size, non tender, no nodules were palpated.  CV: RRR  LUNGS: CTAB  ABDOMEN: +BS  NEUROLOGY: CN grossly intact, no tremors  PSYCH: normal affect and mood      LABS:  A1c:  Lab Results   Component Value Date     A1C 8.7 07/31/2018    A1C 9.1 05/07/2018    A1C 9.0 02/05/2018    A1C 7.7 07/03/2017    A1C 8.3 01/09/2017         BMP:  Last Basic Metabolic Panel:  Lab Results   Component Value Date     07/31/2018      Lab Results   Component Value Date    POTASSIUM 4.4 07/31/2018     Lab Results   Component Value Date    CHLORIDE 105 07/31/2018     Lab Results   Component Value Date    CHANDA 9.4 07/31/2018     Lab Results   Component Value Date    CO2 30 07/31/2018     Lab Results   Component Value Date    BUN 16 07/31/2018     Lab Results   Component Value Date    CR 0.81 07/31/2018     Lab Results   Component Value Date     07/31/2018         Urine Micro:  Lab Results   Component Value Date    MICROL 7 02/05/2018     No results found for: MICROALBUMIN      LFTs/Lipids:  Recent Labs   Lab Test  12/11/17   0835  10/05/16   0710  10/31/15   0923  12/27/14   0922   CHOL  144  155  172  138   HDL  60  56  65  47   LDL  69  90  97  78   TRIG  73  45  48  65   CHOLHDLRATIO   --    --   2.6  2.9       Liver Function Studies -   Recent Labs   Lab Test  12/11/17   0835   PROTTOTAL  7.4   ALBUMIN  3.9   BILITOTAL  0.4   ALKPHOS  78   AST  9   ALT  21         TFTs:  Lab Results   Component Value Date    TSH 0.84 02/05/2018           Blood Glucose download and pump data/ Meter reviewed.     All pertinent notes, labs, and images personally reviewed by me.     A/P  Mr.Eric Medellin is a 38 year old here for the evaluation/management of diabetes:    1. DM1 - Under Poor control.  A1c 8.7%.  No known complications from diabetes  Blood sugar data showing variable blood sugar numbers.  Is interested in insulin pump and I think that he can be a good candidate for that  He knows about carbohydrate counting  Plan  Increase tresiba to 40 units/day  Continue Humalog at current dose  Follow-up with diabetic educator for prepump assessment.  I gave him more information and handouts about different insulin pump including Medtronic 670 G  pump.  Will get labs as below.  Plan: Hemoglobin A1c, C-peptide, Glutamic acid         decarboxylase antibody, DIABETES EDUCATOR         REFERRAL         Recommend checking blood sugars before meals and at bedtime.    If Blood glucose are low more often-> 2-3 times/week- give us a call.  The patient is advised to Make better food choices: reduce carbs, Reduce portion size, weight loss and exercise 3-4 times a week.  Discussed hypoglycemia signs and symptoms as well as management in detail.      There is some variability among people, most will usually develop symptoms suggestive of hypoglycemia when blood glucose levels are lowered to the mid 60's. The first set of symptoms are called adrenergic. Patients may experience any of the following nervousness, sweating, intense hunger, trembling, weakness, palpitations, and difficulty speaking. When BS fall below 50 the patient is unable to talk and take oral therapy.  Would recommend Glucagon emergency kit for the patient and education for family and friends around the patient.   The acute management of hypoglycemia involves the rapid delivery of a source of easily absorbed sugar. Regular soda, juice, lifesavers, table sugar, are good options. 15 grams of glucose is the dose that is given, followed by an assessment of symptoms and a blood glucose check if possible. If after 10 minutes there is no improvement, another 10-15 grams should be given. This can be repeated up to three times. The equivalency of 10-15 grams of glucose (approximate servings) are: Four lifesavers, 4 teaspoons of sugar, or 1/2 cup or 4 oz of juice or regular pop.    2. Hypertension - Under Good  control.  Takes lisinopril 2.5 mg      3. Hyperlipidemia - Under Good control. Takes simvastatin 40 mg    4. Prevention  ASA- NA  Smoking- no    Most Recent Immunizations   Administered Date(s) Administered     Influenza (IIV3) PF 10/10/2012     Influenza Intranasal Vaccine 4 valent 09/22/2015     Influenza  Vaccine IM 3yrs+ 4 Valent IIV4 08/09/2018     Pneumococcal 23 valent 10/10/2012     TDAP Vaccine (Adacel) 10/10/2012         Follow-up:  follow up in 3 month(s)    Marline Reyna M.D  [facility]  CC: Lorelei Louis    More than 50% of face to face time spent with Mr. Medellin on counseling / coordinating his care.     All questions were answered.  The patient indicates understanding of the above issues and agrees with the plan set forth.     Disclaimer: This note consists of symbols derived from keyboarding, dictation and/or voice recognition software. As a result, there may be errors in the script that have gone undetected. Please consider this when interpreting information found in this chart.    Addendum to above note and clinic visit:    Labs reviewed.    See result note/telephone encounter.            Again, thank you for allowing me to participate in the care of your patient.        Sincerely,        Marline Reyna MD

## 2018-10-04 LAB — C PEPTIDE SERPL-MCNC: <0.1 NG/ML (ref 0.9–6.9)

## 2018-10-05 LAB — GAD65 AB SER IA-ACNC: 5.9 IU/ML (ref 0–5)

## 2018-11-27 ENCOUNTER — ALLIED HEALTH/NURSE VISIT (OUTPATIENT)
Dept: EDUCATION SERVICES | Facility: CLINIC | Age: 38
End: 2018-11-27
Payer: COMMERCIAL

## 2018-11-27 DIAGNOSIS — E10.65 TYPE 1 DIABETES MELLITUS WITH HYPERGLYCEMIA (H): Primary | ICD-10-CM

## 2018-11-27 PROCEDURE — G0108 DIAB MANAGE TRN  PER INDIV: HCPCS

## 2018-11-27 NOTE — PATIENT INSTRUCTIONS
1. Count carbohydrates in grams for all meals and snacks    2. Keep food log with grams of carbohydrate for all meals and snacks for 3- 7 days.    3. Check blood sugars 4-6 times a day.     4. Keep glucose and insulin log until next pre-pump visit.    5. No changes made today.    6. I will contact Archana Al, Medtronic pump to reach out to you.     Brianna [place] RD, LD, CDE  Diabetes     [facility] for the [facility]:  For Your Diabetes Education or Nutrition Appointments Call:  [phone]   For Diabetes Education or Nutrition Related Questions Call:   Phone: [phone]  E-mail: [email or web address]  Fax: [phone]   If you need a medication refill please contact your pharmacy. Please allow 3 business days for your refills to be completed.

## 2018-11-27 NOTE — Clinical Note
Just jack de la vega seen for pre-pump initial visit. He is interested in obtaining the Medtronic 670G.  Thanks! Brianna [place] RD, CDE Diabetes

## 2018-11-27 NOTE — MR AVS SNAPSHOT
After Visit Summary   11/27/2018    Ge Medellin    MRN: [id number]           Patient Information     Date Of Birth          1980        Visit Information        Provider Department      11/27/2018 2:30 PM EA DIABETIC ED RESOURCE [facility]        Care Instructions    1. Count carbohydrates in grams for all meals and snacks    2. Keep food log with grams of carbohydrate for all meals and snacks for 3- 7 days.    3. Check blood sugars 4-6 times a day.     4. Keep glucose and insulin log until next pre-pump visit.    5. No changes made today.    6. I will contact Archana Al, Medtronic pump to reach out to you.     Brianna [place] RD, LD, CDE  Diabetes     [place] [facility] for the [facility]:  For Your Diabetes Education or Nutrition Appointments Call:  [phone]   For Diabetes Education or Nutrition Related Questions Call:   Phone: [phone]  E-mail: [email or web address]  Fax: [phone]   If you need a medication refill please contact your pharmacy. Please allow 3 business days for your refills to be completed.              Follow-ups after your visit        Your next 10 appointments already scheduled     Dec 17, 2018  9:30 AM CST   Diabetes Education with Brianna Ramirez RD   [facility] ([facility])    [address] [place] [id number] [phone]              Who to contact     If you have questions or need follow up information about today's clinic visit or your schedule please contact [facility] directly at [phone].  Normal or non-critical lab and imaging results will be communicated to you by MyChart, letter or phone within 4 business days after the clinic has received the results. If you do not hear from us within 7 days, please contact the clinic through MyChart or phone. If you have a critical or abnormal lab  result, we will notify you by phone as soon as possible.  Submit refill requests through [employer] or call your pharmacy and they will forward the refill request to us. Please allow 3 business days for your refill to be completed.          Additional Information About Your Visit        [employer] Information     [employer] gives you secure access to your electronic health record. If you see a primary care provider, you can also send messages to your care team and make appointments. If you have questions, please call your primary care clinic.  If you do not have a primary care provider, please call [phone] and they will assist you.        Care EveryWhere ID     This is your Care EveryWhere ID. This could be used by other organizations to access your [facility] medical records  [id number]         Blood Pressure from Last 3 Encounters:   10/02/18 122/81   08/06/18 116/74   05/11/18 120/76    Weight from Last 3 Encounters:   10/02/18 91.1 kg (200 lb 12.8 oz)   08/06/18 91.6 kg (202 lb)   05/11/18 88 kg (194 lb)              Today, you had the following     No orders found for display       Primary Care Provider Office Phone # Fax #    Lorelei Louis [phone] [address] [place] [address]        Equal Access to Services     [facility]: Hadii aad ku hadasho Soomaali, waaxda luqadaha, qaybta kaalmada adeegyada, waxay idiin haycoltn dorcas dias. So [facility] [phone].    ATENCIÓN: Si habla español, tiene a ramsey disposición servicios gratuitos de asistencia lingüística. Llame al [phone].    We comply with applicable federal civil rights laws and [place] laws. We do not discriminate on the basis of race, color, national origin, age, disability, sex, sexual orientation, or gender identity.            Thank you!     Thank you for choosing [facility] [place]  for your care. Our goal is always to provide you with excellent care. Hearing back from our  patients is one way we can continue to improve our services. Please take a few minutes to complete the written survey that you may receive in the mail after your visit with us. Thank you!             Your Updated Medication List - Protect others around you: Learn how to safely use, store and throw away your medicines at www.disposemymeds.org.          This list is accurate as of 11/27/18  3:20 PM.  Always use your most recent med list.                   Brand Name Dispense Instructions for use Diagnosis    ASPIRIN NOT PRESCRIBED    INTENTIONAL    0 each    continuous prn Antiplatelet medication not prescribed intentionally due to Refusal by patient        * blood glucose monitoring test strip    NO BRAND SPECIFIED    450 strip    5 times daily. Dispense one touch ultra strips per insurance coverage5 times daily. Dispense one touch ultra strips per insurance coverage    Type 1 diabetes, HbA1c goal < 7% (H)       * blood glucose monitoring test strip    NO BRAND SPECIFIED    300 strip    1 strip by In Vitro route 4 times daily    Type 1 diabetes, HbA1c goal < 7% (H)       * blood glucose monitoring test strip    ONETOUCH VERIO IQ    400 each    Use to test blood sugar 4 times daily or as directed.    Type 1 diabetes mellitus with nephropathy (H)       * DIABETIC STERILE LANCETS device     1 Box    1 Device daily.    Type 1 diabetes, HbA1c goal < 7% (H)       * blood glucose monitoring lancets     2 Box    Use to test blood sugars 4 times daily or as directed.    Type 1 diabetes mellitus with nephropathy (H)       insulin degludec 100 UNIT/ML pen    TRESIBA FLEXTOUCH    45 mL    INJECT 40 UNITS SUBCUTANEOUS DAILY    Type 1 diabetes mellitus with hyperglycemia (H), Type 1 diabetes mellitus with microalbuminuria (H)       insulin lispro 100 UNIT/ML pen    HumaLOG PEN    30 mL    Inject Subcutaneous 3 times daily (before meals). 1:10 carb correction with every meal - estimated use 30 units    Type 1 diabetes mellitus with  microalbuminuria (H)       lisinopril 2.5 MG tablet    PRINIVIL/Zestril    90 tablet    Take 1 tablet (2.5 mg) by mouth daily    Type 1 diabetes mellitus with hyperglycemia (H), Type 1 diabetes mellitus with microalbuminuria (H)       simvastatin 40 MG tablet    ZOCOR    90 tablet    Take 1 tablet (40 mg) by mouth At Bedtime    Hyperlipidemia LDL goal <100, Type 1 diabetes mellitus with hyperglycemia (H), Type 1 diabetes mellitus with microalbuminuria (H)       * Notice:  This list has 5 medication(s) that are the same as other medications prescribed for you. Read the directions carefully, and ask your doctor or other care provider to review them with you.

## 2018-11-27 NOTE — PROGRESS NOTES
Diabetes Self-Management Education & Support      Diabetes Self-Management Education & Support - Insulin Pump Pre-Start    SUBJECTIVE/OBJECTIVE  Diabetes education in the past 24mo: No  Diabetes type: Type 1  Disease course: Improving  Diabetes management related comments/concerns: none  Cultural Influences/Ethnic Background:  American    Patient seen today for Insulin Pump Pre-Start:  Information packet(s) provided for the following pump(s): Medtronic 670G, Tandem t:slim X2, Omnipod  Patient is knowledgeable in the following insulin pump concept(s): Carbohydrate counting, Balancing glucose and insulin, Calculating boluses  Insulin Pump Start Plan: Patient would benefit from additional diabetes education visits prior to insulin pump start.    Healthy Eating  Cultural/Episcopalian diet restrictions?: No  Meal planning: Carbohydrate counting, Low salt  Meals include: Lunch, Dinner  Beverages: Water, Coffee, Alcohol  Has patient met with a dietitian in the past?: No    Being Active  Barrier to exercise: None    Monitoring  Blood Glucose Meter: Reli-On  Home Glucose (Sugar) Monitoring: 3-4 times per day  Blood glucose trend: Decreasing steadily  Low Glucose Range (mg/dL): <70  High Glucose Range (mg/dL): >200  Overall Range (mg/dL): >200      Taking Medications  Diabetes Medication(s)     Insulin Sig    insulin degludec (TRESIBA FLEXTOUCH) 100 UNIT/ML pen INJECT 40 UNITS SUBCUTANEOUS DAILY    insulin lispro (HUMALOG PEN) 100 UNIT/ML injection Inject Subcutaneous 3 times daily (before meals). 1:10 carb correction with every meal - estimated use 30 units        Current Treatments: Insulin Injections  Dose schedule: pre-breakfast, pre-lunch, pre-dinner, at bedtime  Given by: Patient  Injection/Infusion sites: Abdomen    Problem Solving  Hypoglycemia Frequency: Monthly  Hypoglycemia Treatment: Other food  Patient carries a carbohydrate source: Yes  Medical alert: No  Severe weather/disaster plan for diabetes management?:  Yes  DKA prevention plan?: Yes  Sick day plan for diabetes management?: (P) Yes    Reducing Risks  CAD Risks: Diabetes Mellitus, Tobacco exposure  Has dilated eye exam at least once a year?: No  Sees dentist every 6 months?: No  Sees podiatrist (foot doctor)?: No    Healthy Coping  Informal Support system:: None  Difficulty affording diabetes management supplies?: No  Patient Activation Measure Survey Score:  ELLIE Score (Last Two) 1/29/2013 10/8/2013   ELLIE Raw Score 48 43   Activation Score 80 68.5   ELLIE Level 4 4       ASSESSMENT  Pt with significant BG variability- likely due to diet and insulin dosing inconsistencies. Is comfortable with carb counting.   Is very interested in obtaining the Medtronic 670G pump - may benefit from sensor augmented insulin delivery.     INTERVENTION:   Diabetes knowledge and skills assessment:     Patient is knowledgeable in diabetes management concepts related to: Healthy Eating, Problem Solving and Healthy Coping    Patient needs further education on the following diabetes management concepts: Monitoring, Taking Medication and Reducing Risks    Based on learning assessment above, most appropriate setting for further diabetes education would be: Individual setting    Education provided today on:  AADE Self-Care Behaviors:  Healthy Eating: importance of carb counting accuracy  Monitoring: individual blood glucose targets and frequency of monitoring  Taking Medication: insulin pump therapy - different pump/CGM options, pros and cons of each system  Reducing Risks: prevention, early diagnostic measures and treatment of complications and A1C - goals, relating to blood glucose levels, how often to check    Opportunities for ongoing education and support in diabetes-self management were discussed.    Pt verbalized understanding of concepts discussed and recommendations provided today.       Education Materials Provided:  Informational packets for Medtronic, Omnipod and Tandem insulin pumps     Thinking About Using an Insulin Pump? handout      PLAN  See Patient Instructions for co-developed, patient-stated behavior change goals.  AVS printed and provided to patient today. See Follow-Up section for recommended follow-up.    Brianna [place] RD, CDE  Diabetes     Time Spent: 60 minutes  Encounter Type: Individual    Any diabetes medication dose changes were made via the CDE Protocol and Collaborative Practice Agreement with the patient's endocrinology provider. A copy of this encounter was shared with the provider.

## 2018-11-28 ENCOUNTER — TELEPHONE (OUTPATIENT)
Dept: ENDOCRINOLOGY | Facility: CLINIC | Age: 38
End: 2018-11-28

## 2018-12-03 PROBLEM — M54.2 CERVICALGIA: Status: RESOLVED | Noted: 2018-05-21 | Resolved: 2018-12-03

## 2018-12-03 PROBLEM — M25.512 LEFT SHOULDER PAIN: Status: RESOLVED | Noted: 2018-05-21 | Resolved: 2018-12-03

## 2018-12-03 NOTE — PROGRESS NOTES
[facility]                 System    Physical Exam    General     ROS    Assessment/Plan:    DISCHARGE REPORT    Progress reporting period is from 5/21/18 to 6/22/18.       SUBJECTIVE   Subjective: Feeling good after the last two weeks, still has slight discomfort in the neck when sitting in poor posture. Performing exercises regularly excpet past 3-4 days. Feels confident in the ability to self-manage.     Current Pain level: 0/10.      Initial Pain level: 2/10.   Changes in function:  Yes (See Goal flowsheet attached for changes in current functional level)  Adverse reaction to treatment or activity: None    OBJECTIVE  Objective: AROM of neck is WNL and painfree. MMT of Mid Trap is 4/5 B and Low Trap is 4/5 B     ASSESSMENT/PLAN  Updated problem list and treatment plan: Diagnosis 1:  Neck/L shoulder pain    STG/LTGs have been met or progress has been made towards goals:  Yes (See Goal flow sheet completed today.)  Assessment of Progress: The patient has not returned to therapy since 6/22/18. Current status is unknown.  Self Management Plans:  Patient is independent in a home treatment program.  Ge continues to require the following intervention to meet STG and LTG's:  PT intervention is no longer required to meet STG/LTG.    Recommendations:  This patient is ready to be discharged from therapy and continue their home treatment program.    Please refer to the daily flowsheet for treatment today, total treatment time and time spent performing 1:1 timed codes.

## 2018-12-17 ENCOUNTER — ALLIED HEALTH/NURSE VISIT (OUTPATIENT)
Dept: EDUCATION SERVICES | Facility: CLINIC | Age: 38
End: 2018-12-17
Payer: COMMERCIAL

## 2018-12-17 DIAGNOSIS — E10.65 TYPE 1 DIABETES MELLITUS WITH HYPERGLYCEMIA (H): Primary | ICD-10-CM

## 2018-12-17 PROCEDURE — G0108 DIAB MANAGE TRN  PER INDIV: HCPCS | Performed by: DIETITIAN, REGISTERED

## 2018-12-17 NOTE — LETTER
12/17/2018         RE: Ge Medellin  [address] [place] [address]        Dear Colleague,    Thank you for referring your patient, Ge Medellin, to the [facility]. Please see a copy of my visit note below.    Diabetes Self-Management Education & Support    SUBJECTIVE/OBJECTIVE  Presents for: Follow-up  Accompanied by: Self  Diabetes education in the past 24mo: Yes  Focus of Visit: Insulin Pump  Diabetes type: Type 1  Disease course: Improving  How confident are you filling out medical forms by yourself:: Extremely  Transportation concerns: No  Other concerns:: None  Cultural Influences/Ethnic Background:  American    Patient seen today for Insulin Pump Pre-Start:  Patient is knowledgeable in the following insulin pump concept(s): Balancing glucose and insulin, Carbohydrate counting, Calculating boluses, Managing insulin pump therapy, Basic button pushing  Insulin Pump Start Plan: Insulin pump start appointment scheduled., Patient has completed pre-insulin pump start education and is ready to proceed with insulin pump start.    Healthy Eating  Cultural/Hinduism diet restrictions?: No  Meal planning: Carbohydrate counting, Low salt  Meals include: Lunch, Dinner  Beverages: Water, Coffee, Alcohol  Has patient met with a dietitian in the past?: No    Pt brought in the following Food/BG and insulin record:          Being Active  Barrier to exercise: None    Monitoring  Blood Glucose Meter: Reli-On  Home Glucose (Sugar) Monitoring: 3-4 times per day  Blood glucose trend: Decreasing steadily  Low Glucose Range (mg/dL): <70  High Glucose Range (mg/dL): >200        Taking Medications  Diabetes Medication(s)     Insulin Sig    insulin degludec (TRESIBA FLEXTOUCH) 100 UNIT/ML pen INJECT 40 UNITS SUBCUTANEOUS DAILY    insulin lispro (HUMALOG PEN) 100 UNIT/ML injection Inject Subcutaneous 3 times daily (before meals). 1:10 carb correction with every meal - estimated use 30 units         Current Treatments: Insulin Injections  Dose schedule: pre-breakfast, pre-lunch, pre-dinner, at bedtime  Given by: Patient  Injection/Infusion sites: Abdomen    Problem Solving  Hypoglycemia Frequency: Monthly  Hypoglycemia Treatment: Other food  Patient carries a carbohydrate source: Yes  Has Glucagon: yes  Medical alert: No  Severe weather/disaster plan for diabetes management?: Yes  DKA prevention plan?: Yes     Reducing Risks  CAD Risks: Diabetes Mellitus, Tobacco exposure  Has dilated eye exam at least once a year?: No  Sees dentist every 6 months?: No  Sees podiatrist (foot doctor)?: No    Healthy Coping  Emotional response to diabetes: Ready to learn, Concern for health and well-being  Informal Support system:: None  Stage of change: ACTION (Actively working towards change)  Difficulty affording diabetes management supplies?: No  Support resources: Websites  Patient Activation Measure Survey Score:  ELLIE Score (Last Two) 1/29/2013 10/8/2013   ELLIE Raw Score 48 43   Activation Score 80 68.5   ELLIE Level 4 4       ASSESSMENT  BG Avg 195 mg/dl improved from >250 mg/dl at last visit, still having significant BG variability. Pt is comfortable with carb counting. Expect blood sugars to improve with insulin pump use. All pump/sensor supplies have been shipped to patient (which he brought in today- to make sure he received everything)- he is motivated and anxious to get started.     INTERVENTION:   Diabetes knowledge and skills assessment:     Patient is knowledgeable in diabetes management concepts related to: Healthy Eating, Being Active, Monitoring, Taking Medication, Problem Solving, Reducing Risks and Healthy Coping    Patient needs further education on the following diabetes management concepts:  Pre-pump start    Based on learning assessment above, most appropriate setting for further diabetes education would be: Individual setting    Education provided today on:  AADE Self-Care Behaviors:  Taking Medication:  process for pump/sensor start, use of 670G Auto mode    Opportunities for ongoing education and support in diabetes-self management were discussed.    Pt verbalized understanding of concepts discussed and recommendations provided today.       Education Materials Provided:  No new materials provided today    PLAN  CDE appointment scheduled for 12/27/18 for Medtronic 670G insulin pump start.  Insulin orders to be sent in a separate phone encounter to Dr. Reyna for signature.     Brianna Ramirez RD, CDE  Diabetes     Time Spent: 60 minutes  Encounter Type: Individual    Any diabetes medication dose changes were made via the CDE Protocol and Collaborative Practice Agreement with the patient's endocrinology provider. A copy of this encounter was shared with the provider.

## 2018-12-18 ENCOUNTER — TELEPHONE (OUTPATIENT)
Dept: EDUCATION SERVICES | Facility: CLINIC | Age: 38
End: 2018-12-18

## 2018-12-18 DIAGNOSIS — E10.65 TYPE 1 DIABETES MELLITUS WITH HYPERGLYCEMIA (H): Primary | ICD-10-CM

## 2018-12-18 NOTE — TELEPHONE ENCOUNTER
Pump start orders:     Formula for insulin pump settings:  Pre-Pump TDD of 60 units insulin - 20% = 48  Units/TDD   Basal rate is 50% of 48 insulin pump TDD = 24 ÷ 24 hours = 1.0 units/hr  I:C ratio: rule of 450 ÷ 48 insulin pump TDD = 9.0 grams/unit  Insulin sensitivity factor:  1800 ÷ 48 insulin pump TDD = 38 mg/dl/ unit    Patient Initial Pump settings:  Medtronic Minimed: Model 670G   BASAL RATES and times:  12   AM (midnight): 1.0 units/hour      CARB RATIO and times:  12   AM (midnight): 9   Corection Factor (Sensitivity) and times:  12   AM (midnight): 38 mg/dL    Active Insulin Time: 3 hours   BLOOD GLUCOSE TARGET:  120   Max basal: 2.0  Max bolus: 15  Temp basal: percent  Low reservoir: 20 units     Recommended sensor settings:  Manual Mode- CGM settings: Low settings: 12am-12am: 70 or per patient preference              -Suspend before low.        -Low snooze: 30 minutes      High settings:12am-12am: 240mg/dl  and alert on high        -High snooze: 120 minutes    Automode: initiate in 1-2 weeks. Auto Mode BG alert: On           Carbohydrate ratio: use existing settings           Active insulin: 3 hours      Additional notes: Adjustments to current basal insulin(Lantus/Levemir/Basaglar/Toujeo/Tresiba) prior to pump start? No-  set temp basal at 0-50% as needed for any basal insulin taken within the last 24 hours of pump start.      Addition order needed:  Insulin vials Yes   Test strips  No   Glucagon No   Ketone test strips Yes   Syringes No     Please let me know if you agree with above initiation settings or indicate alternative plan.  Please sign off on pended Humalog rx.    Brianna Ramirez RD, CDE  Diabetes

## 2018-12-18 NOTE — PROGRESS NOTES
Diabetes Self-Management Education & Support    SUBJECTIVE/OBJECTIVE  Presents for: Follow-up  Accompanied by: Self  Diabetes education in the past 24mo: Yes  Focus of Visit: Insulin Pump  Diabetes type: Type 1  Disease course: Improving  How confident are you filling out medical forms by yourself:: Extremely  Transportation concerns: No  Other concerns:: None  Cultural Influences/Ethnic Background:  American    Patient seen today for Insulin Pump Pre-Start:  Patient is knowledgeable in the following insulin pump concept(s): Balancing glucose and insulin, Carbohydrate counting, Calculating boluses, Managing insulin pump therapy, Basic button pushing  Insulin Pump Start Plan: Insulin pump start appointment scheduled., Patient has completed pre-insulin pump start education and is ready to proceed with insulin pump start.    Healthy Eating  Cultural/Yarsanism diet restrictions?: No  Meal planning: Carbohydrate counting, Low salt  Meals include: Lunch, Dinner  Beverages: Water, Coffee, Alcohol  Has patient met with a dietitian in the past?: No    Pt brought in the following Food/BG and insulin record:          Being Active  Barrier to exercise: None    Monitoring  Blood Glucose Meter: Reli-On  Home Glucose (Sugar) Monitoring: 3-4 times per day  Blood glucose trend: Decreasing steadily  Low Glucose Range (mg/dL): <70  High Glucose Range (mg/dL): >200        Taking Medications  Diabetes Medication(s)     Insulin Sig    insulin degludec (TRESIBA FLEXTOUCH) 100 UNIT/ML pen INJECT 40 UNITS SUBCUTANEOUS DAILY    insulin lispro (HUMALOG PEN) 100 UNIT/ML injection Inject Subcutaneous 3 times daily (before meals). 1:10 carb correction with every meal - estimated use 30 units        Current Treatments: Insulin Injections  Dose schedule: pre-breakfast, pre-lunch, pre-dinner, at bedtime  Given by: Patient  Injection/Infusion sites: Abdomen    Problem Solving  Hypoglycemia Frequency: Monthly  Hypoglycemia Treatment: Other  food  Patient carries a carbohydrate source: Yes  Has Glucagon: yes  Medical alert: No  Severe weather/disaster plan for diabetes management?: Yes  DKA prevention plan?: Yes     Reducing Risks  CAD Risks: Diabetes Mellitus, Tobacco exposure  Has dilated eye exam at least once a year?: No  Sees dentist every 6 months?: No  Sees podiatrist (foot doctor)?: No    Healthy Coping  Emotional response to diabetes: Ready to learn, Concern for health and well-being  Informal Support system:: None  Stage of change: ACTION (Actively working towards change)  Difficulty affording diabetes management supplies?: No  Support resources: Websites  Patient Activation Measure Survey Score:  ELLIE Score (Last Two) 1/29/2013 10/8/2013   ELLIE Raw Score 48 43   Activation Score 80 68.5   ELLIE Level 4 4       ASSESSMENT  BG Avg 195 mg/dl improved from >250 mg/dl at last visit, still having significant BG variability. Pt is comfortable with carb counting. Expect blood sugars to improve with insulin pump use. All pump/sensor supplies have been shipped to patient (which he brought in today- to make sure he received everything)- he is motivated and anxious to get started.     INTERVENTION:   Diabetes knowledge and skills assessment:     Patient is knowledgeable in diabetes management concepts related to: Healthy Eating, Being Active, Monitoring, Taking Medication, Problem Solving, Reducing Risks and Healthy Coping    Patient needs further education on the following diabetes management concepts:  Pre-pump start    Based on learning assessment above, most appropriate setting for further diabetes education would be: Individual setting    Education provided today on:  AADE Self-Care Behaviors:  Taking Medication: process for pump/sensor start, use of 670G Auto mode    Opportunities for ongoing education and support in diabetes-self management were discussed.    Pt verbalized understanding of concepts discussed and recommendations provided today.        Education Materials Provided:  No new materials provided today    PLAN  CDE appointment scheduled for 12/27/18 for Medtronic 670G insulin pump start.  Insulin orders to be sent in a separate phone encounter to Dr. Reyna for signature.     Brianna Ramirez RD, CDE  Diabetes     Time Spent: 60 minutes  Encounter Type: Individual    Any diabetes medication dose changes were made via the CDE Protocol and Collaborative Practice Agreement with the patient's endocrinology provider. A copy of this encounter was shared with the provider.

## 2018-12-27 ENCOUNTER — ALLIED HEALTH/NURSE VISIT (OUTPATIENT)
Dept: EDUCATION SERVICES | Facility: CLINIC | Age: 38
End: 2018-12-27
Payer: COMMERCIAL

## 2018-12-27 DIAGNOSIS — E10.65 TYPE 1 DIABETES MELLITUS WITH HYPERGLYCEMIA (H): ICD-10-CM

## 2018-12-27 DIAGNOSIS — E10.29 TYPE 1 DIABETES MELLITUS WITH MICROALBUMINURIA (H): ICD-10-CM

## 2018-12-27 DIAGNOSIS — R80.9 TYPE 1 DIABETES MELLITUS WITH MICROALBUMINURIA (H): ICD-10-CM

## 2018-12-27 PROCEDURE — G0108 DIAB MANAGE TRN  PER INDIV: HCPCS | Performed by: DIETITIAN, REGISTERED

## 2018-12-27 NOTE — PATIENT INSTRUCTIONS
My Diabetes Care Goals:    Check blood sugar fasting, before meals, 2 hours after the start of meals and bedtime    If blood sugar is above 240 for 2 checks, change the infusion set and site and give yourself an insulin correction dose.     Always carry extra insulin and a syringe or an insulin pen with rapid-acting insulin, your blood glucose meter and test strips, an extra infusion set, and extra batteries for your pump and blood glucose meter.     Always carry a carbohydrate source like glucose tablets and medical identification.      Current Pump Settings:  BASAL RATES and times:  12   AM (midnight): 1.0 units/hour      Insulin to Carbohydrate Ratio:   1 Unit of insulin will cover:  CARB RATIO and times:  12   AM (midnight): 9    Insulin Sensitivity Factor:  1 Unit of insulin will lower your blood sugar:   Corection Factor (Sensitivity) and times:  12   AM (midnight): 38 mg/dL    Blood Glucose Target Range:  BLOOD GLUCOSE TARGET and times:  12   AM (midnight): 120 - 120    Active Insulin Time:  3 hrs    Brianna Ramirez RD, PARAS, CDE  Diabetes     [facility]:  For Your Diabetes Education and Nutrition Appointments Call:  [phone]   For Diabetes Education or Nutrition Related Questions:   Phone: [phone]  E-mail: [email or web address]  Fax: [phone]   If you need a medication refill please contact your pharmacy. Please allow 3 business days for your refills to be completed.    Instructions for emailing the Diabetes Educators    If you need to communicate a non-urgent message to a Diabetes Educator via email, please send to [email or web address].    Please follow the following email guidelines:    Subject line: Secure: your clinic name (example: Secure: [facility])  In the email please include: First name, middle initial, last name and date of birth.    We will be in touch with you within one (1) business day.

## 2018-12-27 NOTE — PROGRESS NOTES
Diabetes Self-Management Education & Support    SUBJECTIVE/OBJECTIVE     Cultural Influences/Ethnic Background:  American    Insulin Pump Start  Insulin Pump Type: Medtronic Minimed 670G System  Infusion Set: Medtronic  Medtronic Infusion Set: [place]  Blood sugar at beginning of pump start appointment (mg/dL): 176 mg/dL  Last basal insulin injection (units): 40  Last bolus insulin injection (units): 4  Last basal injection given at (date): 12/26/18  Last bolus injection given at (date): 12/27/18  Last basal injection given at (time): 0730  Last bolus injection given at (time): 0900  Blood sugar at end of pump start appointment (mg/dl): 125 mg/dl  Pump Problem Solving: No delivery alarm, High blood sugars and DKA prevention, How to order supplies, When to order supplies, When to call a healthcare provider, When to call the pump-company help line  Patient able to start pump without difficulty? Yes    Taking Medications  Diabetes Medication(s)     Insulin Sig    insulin degludec (TRESIBA FLEXTOUCH) 100 UNIT/ML pen INJECT 40 UNITS SUBCUTANEOUS DAILY    insulin lispro (HUMALOG PEN) 100 UNIT/ML injection Inject Subcutaneous 3 times daily (before meals). 1:10 carb correction with every meal - estimated use 30 units    insulin lispro (HUMALOG VIAL) 100 UNIT/ML vial For use with insulin pump. Up to 65 units/day.          ASSESSMENT / INTERVENTION:  Patient instructed on 670G insulin pump:  Basic features: button functions, battery type/insertion, startup wizard,home screen,pump unlock and sleep mode, status bar icon, menu review, audio options, display options, status screens, device options to connect pump/meter. History menu, airplane mode, self test? Yes    Keys to success covered:   Basal: enter basal pattern, reviewed and saved, how to change basal rates, set max basal,suspend delivery/resume  Bolus: enter bolus wizard calculator settings, active insulin,using bolus wizard and practice entering carbohydrates and BG's, max  bolus.   Infusion set: fill reservoir and place infusion set with minimal assistance,discussed set change frequency,and importance of site rotation  Check BG: BG testing schedule and using linking meter  Safety: hypoglycemia - signs and symptoms and treatment (rule of 15), hyperglycemia and DKA prevention, treating BG's over 250mg/dl. Yes   Alerts/alarms: Notification light and audio indication, steps to take to address alerts or alarms?  Yes   Carelink: value of using carelink and instructions for signing up    Additional topics: removal for xray,CT, and MRI, sick day guidelines, back up plan, [place] 24/7 helpline number, when and how to order pump supplies, when to call a healthcare provider  how to access current pump settings through pump download software; keeping a copy of most recent pump settings  Insulin pump was programmed and verified according to the Pump Start Orders signed by MD- see telephone encounter on 12/18/18     Basal rate: 1.0 units per hour  Insulin to Carb Ratio: 1 unit covers 9 grams carbohydrate  Insulin Sensitivity Factor: 1 unit will lower blood glucose 38 mg/dL  BG Targets: 120 - 120  Active Insulin Time: 3 hrs  Dual wave/Square wave Bolus: off  Easy Bolus: off  BG Reminder: on  Auto-off: off  Block: off  Low reservoir: 20 units  Temp basal: Yes     Patient was able to start insulin pump today without difficulty. Yes     Education materials provided to patient: How to Change [place] Infusion Set    FOLLOW-UP:  Telephone follow-up scheduled in 1-3 days. Yes     Post-pump start follow-up appointment scheduled on: 1/07/18     Brianna Ramirez RD, CDE  Diabetes     Time Spent: 120  Visit Type: Individual    Any diabetes medication dose changes were made via the CDE Protocol and Collaborative Practice Agreement with the patient's endocrinology provider. A copy of this encounter was shared with the provider.

## 2018-12-27 NOTE — LETTER
12/27/2018         RE: Ge Medellin  [address] [place] [address]        Dear Colleague,    Thank you for referring your patient, Ge Medellin, to the [facility]. Please see a copy of my visit note below.    Diabetes Self-Management Education & Support    SUBJECTIVE/OBJECTIVE     Cultural Influences/Ethnic Background:  American    Insulin Pump Start  Insulin Pump Type: Medtronic Minimed 670G System  Infusion Set: Medtronic  Medtronic Infusion Set: [place]  Blood sugar at beginning of pump start appointment (mg/dL): 176 mg/dL  Last basal insulin injection (units): 40  Last bolus insulin injection (units): 4  Last basal injection given at (date): 12/26/18  Last bolus injection given at (date): 12/27/18  Last basal injection given at (time): 0730  Last bolus injection given at (time): 0900  Blood sugar at end of pump start appointment (mg/dl): 125 mg/dl  Pump Problem Solving: No delivery alarm, High blood sugars and DKA prevention, How to order supplies, When to order supplies, When to call a healthcare provider, When to call the pump-company help line  Patient able to start pump without difficulty? Yes    Taking Medications  Diabetes Medication(s)     Insulin Sig    insulin degludec (TRESIBA FLEXTOUCH) 100 UNIT/ML pen INJECT 40 UNITS SUBCUTANEOUS DAILY    insulin lispro (HUMALOG PEN) 100 UNIT/ML injection Inject Subcutaneous 3 times daily (before meals). 1:10 carb correction with every meal - estimated use 30 units    insulin lispro (HUMALOG VIAL) 100 UNIT/ML vial For use with insulin pump. Up to 65 units/day.          ASSESSMENT / INTERVENTION:  Patient instructed on 670G insulin pump:  Basic features: button functions, battery type/insertion, startup wizard,home screen,pump unlock and sleep mode, status bar icon, menu review, audio options, display options, status screens, device options to connect pump/meter. History menu, airplane mode, self test? Yes    Keys to success covered:    Basal: enter basal pattern, reviewed and saved, how to change basal rates, set max basal,suspend delivery/resume  Bolus: enter bolus wizard calculator settings, active insulin,using bolus wizard and practice entering carbohydrates and BG's, max bolus.   Infusion set: fill reservoir and place infusion set with minimal assistance,discussed set change frequency,and importance of site rotation  Check BG: BG testing schedule and using linking meter  Safety: hypoglycemia - signs and symptoms and treatment (rule of 15), hyperglycemia and DKA prevention, treating BG's over 250mg/dl. Yes   Alerts/alarms: Notification light and audio indication, steps to take to address alerts or alarms?  Yes   Carelink: value of using carelink and instructions for signing up    Additional topics: removal for xray,CT, and MRI, sick day guidelines, back up plan, [place] 24/7 helpline number, when and how to order pump supplies, when to call a healthcare provider  how to access current pump settings through pump download software; keeping a copy of most recent pump settings  Insulin pump was programmed and verified according to the Pump Start Orders signed by MD- see telephone encounter on 12/18/18     Basal rate: 1.0 units per hour  Insulin to Carb Ratio: 1 unit covers 9 grams carbohydrate  Insulin Sensitivity Factor: 1 unit will lower blood glucose 38 mg/dL  BG Targets: 120 - 120  Active Insulin Time: 3 hrs  Dual wave/Square wave Bolus: off  Easy Bolus: off  BG Reminder: on  Auto-off: off  Block: off  Low reservoir: 20 units  Temp basal: Yes     Patient was able to start insulin pump today without difficulty. Yes     Education materials provided to patient: How to Change [place] Infusion Set    FOLLOW-UP:  Telephone follow-up scheduled in 1-3 days. Yes     Post-pump start follow-up appointment scheduled on: 1/07/18     Brianna Ramirez RD, CDE  Diabetes     Time Spent: 120  Visit Type: Individual    Any diabetes medication dose  changes were made via the CDE Protocol and Collaborative Practice Agreement with the patient's endocrinology provider. A copy of this encounter was shared with the provider.

## 2018-12-28 ENCOUNTER — PATIENT OUTREACH (OUTPATIENT)
Dept: EDUCATION SERVICES | Facility: CLINIC | Age: 38
End: 2018-12-28
Payer: COMMERCIAL

## 2018-12-28 DIAGNOSIS — E10.65 TYPE 1 DIABETES MELLITUS WITH HYPERGLYCEMIA (H): Primary | ICD-10-CM

## 2018-12-28 NOTE — PROGRESS NOTES
Diabetes Self-Management Training: Insulin Pump Telephone Visit        Diabetes Medication(s)     Insulin Sig    insulin degludec (TRESIBA FLEXTOUCH) 100 UNIT/ML pen Use for pump failure only. INJECT 40 UNITS SUBCUTANEOUS DAILY    insulin lispro (HUMALOG PEN) 100 UNIT/ML pen Use for pump failure/ DKA prevention only. Inject Subcutaneous 3 times daily (before meals). 1:10 carb +correction with every meal - estimated use 30 units    insulin lispro (HUMALOG VIAL) 100 UNIT/ML vial For use with insulin pump. Up to 65 units/day.    INSULIN PUMP - OUTPATIENT Date last updated:  12/27/18  Medtronic Minimed: Model 670G  BASAL RATES and times:  12   AM (midnight): 1.0 units/hour    CARB RATIO and times:  12   AM (midnight): 9.0  Corection Factor (Sensitivity) and times:  12   AM (midnight): 38 mg/dL  BLOOD GLUCOSE TARGET and times:  12   AM (midnight): 120 - 120  Active Insulin Time:  3.0 hours  Sensor:  pending  [employer] / [employer] username:    [employer] / [employer] Password:          Assessment/ Plan:  Phone call out to patient to follow up on progress- 1 day post insulin pump start. Pt reports went home today at lunch, as he suspected he was not getting insulin and that there was an issue with his infusion set. Reports BG's were 95 and 259 yesterday, however during the night was 376 mg/dl, took correction but BG continued to rise during the morning hours and now at 473 mg/dl. Reports no symptoms of DKA. Advised pt to immediately take an insulin injection per his correction scale (pt took 6 units while on phone). Pt pulled out infusion set and reported that canula was bent. Pt will change infusion set as instructed, will continue to correct hyperglycemia (pay attention to active insulin), advised to increase water intake, go to ER if spilling ketones and/or developing symptoms of DKA. Pt agreeable. If any pump technical issues over the week-end, advised to call [employer] [phone] help line.     Telephone follow up with pt planned  for Mon 12/31/18- pt will upload pump to [facility] on Sun night.     Brianna Ramirez RD, CDE  Diabetes     Any diabetes medication dose changes were made via the CDE Protocol and Collaborative Practice Agreement with the patient's referring provider. A copy of this encounter was shared with the provider.

## 2018-12-31 ENCOUNTER — PATIENT OUTREACH (OUTPATIENT)
Dept: EDUCATION SERVICES | Facility: CLINIC | Age: 38
End: 2018-12-31
Payer: COMMERCIAL

## 2018-12-31 DIAGNOSIS — E10.65 TYPE 1 DIABETES MELLITUS WITH HYPERGLYCEMIA (H): Primary | ICD-10-CM

## 2019-01-01 NOTE — PROGRESS NOTES
"Diabetes Self-Management Training: Insulin Pump Telephone Visit - 3 day follow up    Current Diabetes Management per Patient:  Comments/concerns: had difficulty over the week-end with high BG's, but realized he did not take the needle guard off the infusion set before inserting, took insulin by injection and changed his set per instruction this morning.    Insulin Pump Type: Medtronic Minimed 670G - Manual mode    Taking other diabetes medications? no    Current Pump report:        Assessment/Plan:  No changes made at this time due to insufficient data with patient using correct technique for inserting infusion set. Pt now verbalizes good understanding- and states \"I see it as a 3 day practice\"- it will go better now.  Upload pump in 3 days.    Brianna Ramirez RD, CDE  Diabetes       Any diabetes medication dose changes were made via the CDE Protocol and Collaborative Practice Agreement with the patient's referring provider. A copy of this encounter was shared with the provider.    "

## 2019-01-04 ENCOUNTER — PATIENT OUTREACH (OUTPATIENT)
Dept: EDUCATION SERVICES | Facility: CLINIC | Age: 39
End: 2019-01-04
Payer: COMMERCIAL

## 2019-01-04 DIAGNOSIS — E10.65 TYPE 1 DIABETES MELLITUS WITH HYPERGLYCEMIA (H): Primary | ICD-10-CM

## 2019-01-05 NOTE — PROGRESS NOTES
"Diabetes Self-Management Training: Insulin Pump Telephone Visit    Current Diabetes Management per Patient:  Comments/concerns: see [employer] message- pt reports \"blood sugars are good except high in the morning\"    Insulin Pump Type: Medtronic Minimed 670G started on 12/27/18- currently in manual mode, sensor start pending.      Diabetes Medication(s)     Insulin Sig    insulin degludec (TRESIBA FLEXTOUCH) 100 UNIT/ML pen Use for pump failure only. INJECT 40 UNITS SUBCUTANEOUS DAILY    insulin lispro (HUMALOG PEN) 100 UNIT/ML pen Use for pump failure/ DKA prevention only. Inject Subcutaneous 3 times daily (before meals). 1:10 carb +correction with every meal - estimated use 30 units    insulin lispro (HUMALOG VIAL) 100 UNIT/ML vial For use with insulin pump. Up to 65 units/day.    INSULIN PUMP - OUTPATIENT Date last updated:  12/27/18  Medtronic Minimed: Model 670G  BASAL RATES and times:  12   AM (midnight): 1.0 units/hour    CARB RATIO and times:  12   AM (midnight): 9.0  Corection Factor (Sensitivity) and times:  12   AM (midnight): 38 mg/dL  BLOOD GLUCOSE TARGET and times:  12   AM (midnight): 120 - 120  Active Insulin Time:  3.0 hours  Sensor:  pending  Carelink / Diasend username:    Carelink / Diasend Password:          Current Pump report:      Assessment/Plan:  Pt may benefit from increase in basal rate over-night. However, pt will be very active over the week-end and will have an in clinic CDE visit on 1/7 for sensor start and setting adjustments. Pt provided with options. Message sent.     Brianna Ramirez RD, CDE  Diabetes       Any diabetes medication dose changes were made via the CDE Protocol and Collaborative Practice Agreement with the patient's referring provider. A copy of this encounter was shared with the provider.    "

## 2019-01-07 ENCOUNTER — ALLIED HEALTH/NURSE VISIT (OUTPATIENT)
Dept: EDUCATION SERVICES | Facility: CLINIC | Age: 39
End: 2019-01-07
Payer: COMMERCIAL

## 2019-01-07 DIAGNOSIS — E10.65 TYPE 1 DIABETES MELLITUS WITH HYPERGLYCEMIA (H): ICD-10-CM

## 2019-01-07 PROCEDURE — G0108 DIAB MANAGE TRN  PER INDIV: HCPCS | Performed by: DIETITIAN, REGISTERED

## 2019-01-07 NOTE — LETTER
1/7/2019         RE: Ge Medellin  [address] [place] [address]        Dear Colleague,    Thank you for referring your patient, Ge Medellin, to the [facility]. Please see a copy of my visit note below.    Diabetes Self Management Training: Personal Continuous Glucose Monitor Start    Ge Medellin presents today for initiation of personal continuous glucose monitoring with patient-owned device related to Type 1 diabetes.    He is accompanied by self    Patient's diabetes management related comments/concerns: morning blood sugars are still high    Patient would like this visit to be focused around the following diabetes-related behaviors and goals: sensor start    ASSESSMENT:    Current Diabetes Management per Patient:  Insulin Pump Type: Medtronic Minimed 670G - currently in manual mode       Diabetes Medication(s)     Insulin Sig    insulin degludec (TRESIBA FLEXTOUCH) 100 UNIT/ML pen Use for pump failure only. INJECT 40 UNITS SUBCUTANEOUS DAILY    insulin lispro (HUMALOG PEN) 100 UNIT/ML pen Use for pump failure/ DKA prevention only. Inject Subcutaneous 3 times daily (before meals). 1:10 carb +correction with every meal - estimated use 30 units    insulin lispro (HUMALOG VIAL) 100 UNIT/ML vial For use with insulin pump. Up to 65 units/day.    INSULIN PUMP - OUTPATIENT         Insulin Pump Report:            BG values are: Not in goal  Patient's most recent   Lab Results   Component Value Date    A1C 8.5 10/02/2018    is not meeting goal of <7.0    Patient experiencing hypoglycemia? yes  Symptoms of low blood sugar? yes  Patient carries a carbohydrate source with them regularly: Yes   Type of carbohydrate: glucose tablets    Nutrition:  Patient counts carbohydrates in grams    Physical Activity:  Type: Walking    CGM Preparation:  Patient completed homework (online training and/or Getting started with CGM guide)? Yes    Labs:  Lab Results   Component Value Date    A1C 8.5  10/02/2018     Lab Results   Component Value Date     07/31/2018       INTERVENTION:    Patient was able to demonstrate ability to insert and start sensor without difficulty.    Education provided on:  Medtronic 670G pump/CGM system - Guardian 3 sensor, transmitter, integration with 670G insulin pump, Auto-mode, sensor glucose versus blood glucose, trends and graphs, alarms and alerts, Suspend Before Low feature, sensor insertion, taping, calibrating, Carelink Personal software & uploading    CGM initial settings:   Medtronic Enlite: Glucose Alerts: On  Low: 70 mg/dL  High: 250 mg/dL  Low snooze: 30 minutes  High snooze: 2 hours  Predictive Alert:   Low: On  High: Off  Rate Alert: Off    PLAN:  Calibrate sensor 2-3 times/day or as directed.  Change sensor, as directed.  Changes made to pump settings:   basal rate:   12 am to 9 am: increase from 1.0 --> to 1.2  9 am to 12 am: continue 1.0     Follow-up:    Follow-up appointment scheduled on Wed, Jan. 16th.  Education topics to cover at the next diabetes education visit(s): Auto mode start  Chart routed to referring provider.    Brianna Ramirez RD, CDE  Diabetes     Time Spent: 60 minutes  Encounter Type: Individual    Any diabetes medication dose changes were made via the CDE Protocol and Collaborative Practice Agreement with the patient's endocrinology provider. A copy of this encounter was shared with the provider.

## 2019-01-09 ENCOUNTER — PATIENT OUTREACH (OUTPATIENT)
Dept: EDUCATION SERVICES | Facility: CLINIC | Age: 39
End: 2019-01-09
Payer: COMMERCIAL

## 2019-01-09 DIAGNOSIS — E10.65 TYPE 1 DIABETES MELLITUS WITH HYPERGLYCEMIA (H): Primary | ICD-10-CM

## 2019-01-09 NOTE — PROGRESS NOTES
Diabetes Self-Management Training: Insulin Pump Telephone Visit    Current Diabetes Management per Patient:  Comments/concerns: Pt uploaded to CareLink per instruction at last CDE visit on 1/7/19.    Insulin Pump Type: Medtronic Minimed 670G System - currently in manual mode    Diabetes Medication(s)     Insulin Sig    insulin degludec (TRESIBA FLEXTOUCH) 100 UNIT/ML pen Use for pump failure only. INJECT 40 UNITS SUBCUTANEOUS DAILY    insulin lispro (HUMALOG PEN) 100 UNIT/ML pen Use for pump failure/ DKA prevention only. Inject Subcutaneous 3 times daily (before meals). 1:10 carb +correction with every meal - estimated use 30 units    insulin lispro (HUMALOG VIAL) 100 UNIT/ML vial For use with insulin pump. Up to 65 units/day.    INSULIN PUMP - OUTPATIENT Date last updated:  01/07/19  Medtronic Minimed: Model 670G  BASAL RATES and times:  12 AM - 9 AM: 1.2 units/hour    9 AM - 12 AM:  1.0  CARB RATIO and times:  12   AM (midnight): 9.0  Corection Factor (Sensitivity) and times:  12   AM (midnight): 38 mg/dL  BLOOD GLUCOSE TARGET and times:  12   AM (midnight): 120 - 120  Active Insulin Time:  3.0 hours  Sensor:  pending  Carelink / Diasend username:    Carelink / Diasend Password:          Current Pump report:              Assessment/Plan:  Afternoon hypoglycemia noted. Pump adjustment made on 1/7/19 was to add a higher basal rate during the night- however per pump upload, appears that increased basal rate was inadvertently extended by patient to 9 pm (instead of 9 am). See pump settings listed on Med list.     Change basal rate times to the following (as discussed at last visit):    12 am to 9 am (instead of 12 am to 9 pm): 1.2 unit(s)/hr  9 am to 12 pm:  1.0 unit(s)/hr    [employer] message sent to patient. Recommend pump upload in 2 days.    Brianna Ramirez RD, CDE  Diabetes       Any diabetes medication dose changes were made via the CDE Protocol and Collaborative Practice Agreement with the  patient's referring provider. A copy of this encounter was shared with the provider.

## 2019-01-11 ENCOUNTER — ALLIED HEALTH/NURSE VISIT (OUTPATIENT)
Dept: EDUCATION SERVICES | Facility: CLINIC | Age: 39
End: 2019-01-11
Payer: COMMERCIAL

## 2019-01-11 DIAGNOSIS — E10.65 TYPE 1 DIABETES MELLITUS WITH HYPERGLYCEMIA (H): Primary | ICD-10-CM

## 2019-01-11 NOTE — LETTER
1/11/2019         RE: Ge Medellin  [address] [place] [address]        Dear Colleague,    Thank you for referring your patient, Ge Medellin, to the [facility]. Please see a copy of my visit note below.    Diabetes Self-Management Training: Insulin Pump Telephone Visit    Current Pump report:                      Assessment/Plan:  Some variability with pump suspends since yesterday- however unclear due to limited new data, on whether basal rate or I:C or correction will need adjustment. Recommend upload again in 2 days. Pt is also scheduled for Auto mode start on 1/16/19. [employer] message sent to patient.    Brianna Ramirez RD, CDE  Diabetes       Any diabetes medication dose changes were made via the CDE Protocol and Collaborative Practice Agreement with the patient's referring provider. A copy of this encounter was shared with the provider.

## 2019-01-11 NOTE — PROGRESS NOTES
Diabetes Self-Management Training: Insulin Pump Telephone Visit    Current Pump report:                      Assessment/Plan:  Some variability with pump suspends since yesterday- however unclear due to limited new data, on whether basal rate or I:C or correction will need adjustment. Recommend upload again in 2 days. Pt is also scheduled for Auto mode start on 1/16/19. [employer] message sent to patient.    Brianna Ramirez RD, CDE  Diabetes       Any diabetes medication dose changes were made via the CDE Protocol and Collaborative Practice Agreement with the patient's referring provider. A copy of this encounter was shared with the provider.

## 2019-01-14 ENCOUNTER — PATIENT OUTREACH (OUTPATIENT)
Dept: EDUCATION SERVICES | Facility: CLINIC | Age: 39
End: 2019-01-14
Payer: COMMERCIAL

## 2019-01-14 NOTE — PROGRESS NOTES
"Diabetes Self-Management Training: Insulin Pump Telephone Visit    Current Diabetes Management per Patient:     Diabetes Medication(s)     Insulin Sig    insulin degludec (TRESIBA FLEXTOUCH) 100 UNIT/ML pen Use for pump failure only. INJECT 40 UNITS SUBCUTANEOUS DAILY    insulin lispro (HUMALOG PEN) 100 UNIT/ML pen Use for pump failure/ DKA prevention only. Inject Subcutaneous 3 times daily (before meals). 1:10 carb +correction with every meal - estimated use 30 units    insulin lispro (HUMALOG VIAL) 100 UNIT/ML vial For use with insulin pump. Up to 65 units/day.    INSULIN PUMP - OUTPATIENT Date last updated:  01/07/19  Medtronic Minimed: Model 670G  BASAL RATES and times:  12 AM - 9 AM: 1.2 units/hour    9 AM - 12 AM:  1.0  CARB RATIO and times:  12   AM (midnight): 9.0  Corection Factor (Sensitivity) and times:  12   AM (midnight): 38 mg/dL  BLOOD GLUCOSE TARGET and times:  12   AM (midnight): 120 - 120  Active Insulin Time:  3.0 hours  Sensor:  pending  [employer] username:    Carelink / [employer]send Password:          Current Pump report:              Assessment/Plan:  Areas of concern per review of pump report:  - BG variability- potentially due to significant amount of manual boluses  - is not doing canula fills with infusion set changes as instructed  - is taking less correction than bolus wizard would calculate based on current settings  - BG trends low in the afternoon, followed by rebound hyperglycemia after pump suspends, potentially due to over-treatment of low BG's    [employer] message sent to patient with the following recommendations:     [place],    Sorry to hear about the infusion set trouble. Please make sure you complete the last step for the cannula fill of 0.3 units. In looking at your report- it looks like that step has been missing or the last fill looks like it was 0.6 (instead of 0.3).     Also- some additional points to consider. I see that you are doing quite a few \"manual boluses\"- " "which I know can be due to variety of reasons. The first thing I am wondering is if you are \"trusting the bolus wizard calculations\" or do you feel something is off?    I can think of a couple of setting adjustments- but then it will be really important to follow the pump dosing calculations prior to starting Auto mode. Remember that the pump is trying to figure out your very own algorithm. Therefore, we want to have those pump settings as accurate as possible with consistent bolus wizard use. Try not to add any additional manual boluses- if you feel you have to- please let me know the reasons so we can so some problem solving.     Also- when you go low and the pump suspends, I see that you bounce high. When the pump is suspending, please remember not to over-treat the low blood sugar. Usually we recommend only 5-10 gms of carbs, if needed.     I would like to adjust your basal rate to the following (less insulin in the afternoon, more in the evening):    12 am - 9 am: 1.2  9 am - 1 pm: 1.0  1 pm - 6 pm: 0.9  6 pm - 12 am: 1.1    Also- please adjust the sensitivity/ correction from 38 --> to 40.      Again- please try not to add any \"manual\" boluses. Only use the bolus wizard. If you feel that any of the settings are not working well (eg the pump offers too much or not enough insulin)- please let me know!     Kind regards,  Brianna Ramirez RD, CDE  Diabetes       Any diabetes medication dose changes were made via the CDE Protocol and Collaborative Practice Agreement with the patient's referring provider. A copy of this encounter was shared with the provider.    "

## 2019-01-16 ENCOUNTER — ALLIED HEALTH/NURSE VISIT (OUTPATIENT)
Dept: EDUCATION SERVICES | Facility: CLINIC | Age: 39
End: 2019-01-16
Payer: COMMERCIAL

## 2019-01-16 DIAGNOSIS — E10.21 TYPE 1 DIABETES MELLITUS WITH NEPHROPATHY (H): Primary | ICD-10-CM

## 2019-01-16 DIAGNOSIS — E10.65 TYPE 1 DIABETES MELLITUS WITH HYPERGLYCEMIA (H): ICD-10-CM

## 2019-01-16 PROCEDURE — G0108 DIAB MANAGE TRN  PER INDIV: HCPCS | Performed by: DIETITIAN, REGISTERED

## 2019-01-16 NOTE — LETTER
1/16/2019         RE: Ge Medellin  [address] [place] [address]        Dear Colleague,    Thank you for referring your patient, Ge Medellin, to the [facility]. Please see a copy of my visit note below.    Diabetes Self Management Training: Insulin Pump    SUBJECTIVE:  Patient presents for an insulin pump 670G Auto Mode   After discussion with patient regarding current challenges with  Manual mode- will defer actual transition to Auto mode until next visit. Manual and Auto mode education was provided today.  OBJECTIVE:  Vitals: There were no vitals taken for this visit.    Carelink Report:                  ASSESSMENT / INTERVENTION:  Over-all, blood sugars are improving since start of pump therapy. Pt pleased with progress thus far.    Patient instructed on the differences between auto mode, safe mode, and manual mode     Download to carelink- reviewed results and  stressed the importance of the following:    -Calibrating appropriately- currently exceeding >4x/day and has been calibrating when BG changing rapidly   -Avoid manual boluses, always use the Bolus Wizard- reviewed how to correctly enter BG and carbs  -Do not suspend pump for extended periods (was suspending while hunting for several hours), reminded to only suspend for short periods of time- eg showers, etc  -Can use temp basal for exercise (explained difference between temp target while in Auto mode vs temp basal while using manual mode)  -Bolus 5-10 min before meals, snacks and use correction as needed via bolus wizard  -Discussed completing the process of infusion set changes with canula fill- pt had been skipping this step  -Discussed ways to avoid infusion sets coming out during the night, pt has been in contact with [employer] as well  -Review of pump settings    Changes made to pump settings:  basal rate (more insulin in the evening, less during the afternoon):     12 AM - 9 am: 1.2   9 am - 1 pm: 1.0   1 pm -  6 pm: 0.9   6 pm - 12 am: 1.1     Education materials provided:  no new materials provided at today's visit    FOLLOW-UP:  Appt 1/21/19 for Auto mode start    Brianna Ramirez RD, CDE  Diabetes     Time Spent: 60  Visit Type: Individual    Any diabetes medication dose changes were made via the CDE Protocol and Collaborative Practice Agreement with the patient's endocrinology provider. A copy of this encounter was shared with the provider.

## 2019-01-16 NOTE — PROGRESS NOTES
Diabetes Self Management Training: Insulin Pump    SUBJECTIVE:  Patient presents for an insulin pump 670G Auto Mode   After discussion with patient regarding current challenges with  Manual mode- will defer actual transition to Auto mode until next visit. Manual and Auto mode education was provided today.  OBJECTIVE:  Vitals: There were no vitals taken for this visit.    Carelink Report:                  ASSESSMENT / INTERVENTION:  Over-all, blood sugars are improving since start of pump therapy. Pt pleased with progress thus far.    Patient instructed on the differences between auto mode, safe mode, and manual mode     Download to carelink- reviewed results and stressed the importance of the following:    -Calibrating appropriately- currently exceeding >4x/day and has been calibrating when BG changing rapidly   -Avoid manual boluses, always use the Bolus Wizard- reviewed how to correctly enter BG and carbs  -Do not suspend pump for extended periods (was suspending while hunting for several hours), reminded to only suspend for short periods of time- eg showers, etc  -Can use temp basal for exercise (explained difference between temp target while in Auto mode vs temp basal while using manual mode)  -Bolus 5-10 min before meals, snacks and use correction as needed via bolus wizard  -Discussed completing the process of infusion set changes with canula fill- pt had been skipping this step  -Discussed ways to avoid infusion sets coming out during the night, pt has been in contact with [employer] as well  -Review of pump settings    Changes made to pump settings:  basal rate (more insulin in the evening, less during the afternoon):     12 AM - 9 am: 1.2   9 am - 1 pm: 1.0   1 pm - 6 pm: 0.9   6 pm - 12 am: 1.1     Education materials provided: no new materials provided at today's visit    FOLLOW-UP:  Appt 1/21/19 for Auto mode start    Brianna Ramirez RD, CDE  Diabetes     Time Spent: 60  Visit Type:  Individual    Any diabetes medication dose changes were made via the CDE Protocol and Collaborative Practice Agreement with the patient's endocrinology provider. A copy of this encounter was shared with the provider.

## 2019-01-16 NOTE — Clinical Note
Just an fyi- working with Ge on appropriate 670 pump use before switching him to Auto mode- which was deferred to next week. Thanks!Brianna

## 2019-01-18 ENCOUNTER — PATIENT OUTREACH (OUTPATIENT)
Dept: EDUCATION SERVICES | Facility: CLINIC | Age: 39
End: 2019-01-18
Payer: COMMERCIAL

## 2019-01-18 DIAGNOSIS — E10.21 TYPE 1 DIABETES MELLITUS WITH NEPHROPATHY (H): Primary | ICD-10-CM

## 2019-01-18 NOTE — PROGRESS NOTES
Diabetes Self-Management Training: Insulin Pump MyChart Visit    Patient uploaded pump/CGM to CareLink for review. Last date of contact: 1/16/18  Pt is scheduled for 670G Auto mode start 1/21/19.    Current Diabetes Treatment:    Diabetes Medication(s)     Insulin Sig    insulin degludec (TRESIBA FLEXTOUCH) 100 UNIT/ML pen Use for pump failure only. INJECT 40 UNITS SUBCUTANEOUS DAILY    insulin lispro (HUMALOG PEN) 100 UNIT/ML pen Use for pump failure/ DKA prevention only. Inject Subcutaneous 3 times daily (before meals). 1:10 carb +correction with every meal - estimated use 30 units    insulin lispro (HUMALOG VIAL) 100 UNIT/ML vial For use with insulin pump. Up to 65 units/day.    INSULIN PUMP - OUTPATIENT Date last updated:  01/16/19  Medtronic Minimed: Model 670G  BASAL RATES and times:  12 AM: 1.2 units/hour    9 AM: 1.0  1 PM: 0.9  6 PM 1.1  CARB RATIO and times:  12   AM (midnight): 9.0  Corection Factor (Sensitivity) and times:  12   AM (midnight): 40 mg/dL  BLOOD GLUCOSE TARGET and times:  12   AM (midnight): 120 - 120  Active Insulin Time:  3.0 hours  CareLink: yes          Blood Glucose Results and Insulin Use:         Current Pump/ CGM Settings (manual mode):             CGM data:      Hypoglycemia:    None- currently using suspend before low      Assessment:  Patient may benefit from a stronger I:C at morning and evening meals, with less I:C for mid-day meal    Intervention/Plan:  Recommend change carb ratio:  12 am: 9 --> 8.5  10 am: 9 --> 9.5  3 pm: 9 --> 8.5    Follow-up:  Follow-up appointment scheduled on 1/21/19.  [employer] message sent to patient.    Brianna Ramirez RD, CDE  Diabetes     Any diabetes medication dose changes were made via the CDE Protocol and Collaborative Practice Agreement with the patient's endocrinology provider. A copy of this encounter was shared with the provider.

## 2019-01-21 ENCOUNTER — ALLIED HEALTH/NURSE VISIT (OUTPATIENT)
Dept: EDUCATION SERVICES | Facility: CLINIC | Age: 39
End: 2019-01-21
Payer: COMMERCIAL

## 2019-01-21 DIAGNOSIS — E10.65 TYPE 1 DIABETES MELLITUS WITH HYPERGLYCEMIA (H): ICD-10-CM

## 2019-01-21 PROCEDURE — G0108 DIAB MANAGE TRN  PER INDIV: HCPCS | Performed by: DIETITIAN, REGISTERED

## 2019-01-21 NOTE — PROGRESS NOTES
Diabetes Self Management Training: Insulin Pump initiate Auto Mode    SUBJECTIVE:  Patient presents for an insulin pump Auto Mode   Pump Type: Medtronic 670G System  Patient Comments/concerns:  doing much better using the bolus wizard consistently, is changing infusion sets as directed, and is now following calibration guidelines as recommended, still having some high blood sugar spikes after eating- which may be somewhat due to significant carb loads  OBJECTIVE:  Vitals: There were no vitals taken for this visit.    ASSESSMENT / INTERVENTION:  Patient instructed on the differences between auto mode, safe mode, and manual mode     Download to [employer] review of results and the importance of the following:                        Calibrating appropriately                        Post meal BG within target                        Carbs enter correctly                        Correction bolus achieve target                        Boluses given premeal/snack                        Basal/bolus ratio   Auto mode warm up complete and sensor is on and working- Yes       Carb ratios and active insulin time entered - Yes       No active temp basal, suspend or dual/square bolus running  Turn on auto mode and auto mode BG alert. Yes       Go to auto mode readiness screen      Enter BG   Review training tips   Patient entered auto mode without difficulty Yes      Changes made to pump settings:  carb ratio:   12 am: 8.5 --> 8.0  10 am: continue 9.0  3 pm:  8.5 --> 8.0    active insulin adjusted: continue 3 hrs     Education materials provided: training tips for 670G pump in manual and auto mode  Yes     FOLLOW-UP:  CDE follow up 2/18/19. Upload to [facility] 1-2 weeks.     Brianna Ramirez RD, CDE  Diabetes     Time Spent: 45  Visit Type: Individual    Any diabetes medication dose changes were made via the CDE Protocol and Collaborative Practice Agreement with the patient's endocrinology provider. A copy of this  encounter was shared with the provider.

## 2019-01-21 NOTE — LETTER
1/21/2019         RE: Ge Medellin  [address] [place] [address]        Dear Colleague,    Thank you for referring your patient, Ge Medellin, to the [facility]. Please see a copy of my visit note below.    Diabetes Self Management Training: Insulin Pump initiate Auto Mode    SUBJECTIVE:  Patient presents for an insulin pump Auto Mode   Pump Type: Medtronic 670G System  Patient Comments/concerns:  doing much better using the bolus wizard consistently, is changing infusion sets as directed, and is now following calibration guidelines as recommended, still having some high blood sugar spikes after eating- which may be somewhat due to significant carb loads  OBJECTIVE:  Vitals: There were no vitals taken for this visit.    ASSESSMENT / INTERVENTION:  Patient instructed on the differences between auto mode, safe mode, and manual mode     Download to [employer] review of results and the importance of the following:                        Calibrating appropriately                        Post meal BG within target                        Carbs enter correctly                        Correction bolus achieve target                        Boluses given premeal/snack                        Basal/bolus ratio   Auto mode warm up complete and sensor is on and working- Yes       Carb ratios and active insulin time entered -  Yes       No active temp basal, suspend or dual/square bolus running  Turn on auto mode and auto mode BG alert. Yes       Go to auto mode readiness screen      Enter BG   Review training tips   Patient entered auto mode without difficulty Yes      Changes made to pump settings:  carb ratio:   12 am: 8.5 --> 8.0  10 am: continue 9.0  3 pm:  8.5 --> 8.0    active insulin adjusted: continue 3 hrs     Education materials provided: training tips for 670G pump in manual and auto mode  Yes     FOLLOW-UP:  CDE follow up 2/18/19. Upload to [employer] 1-2 weeks.     Brianna Ramirez RD,  CDE  Diabetes     Time Spent: 45  Visit Type: Individual    Any diabetes medication dose changes were made via the CDE Protocol and Collaborative Practice Agreement with the patient's endocrinology provider. A copy of this encounter was shared with the provider.

## 2019-02-04 ENCOUNTER — TELEPHONE (OUTPATIENT)
Dept: ENDOCRINOLOGY | Facility: CLINIC | Age: 39
End: 2019-02-04

## 2019-02-04 NOTE — TELEPHONE ENCOUNTER
Panel Management Review      Patient has the following on his problem list:     Diabetes    ASA: Passed    Last A1C  Lab Results   Component Value Date    A1C 8.5 10/02/2018    A1C 8.7 07/31/2018    A1C 9.1 05/07/2018    A1C 9.0 02/05/2018    A1C 7.7 07/03/2017     A1C tested: FAILED    Last LDL:    Lab Results   Component Value Date    CHOL 144 12/11/2017     Lab Results   Component Value Date    HDL 60 12/11/2017     Lab Results   Component Value Date    LDL 69 12/11/2017     Lab Results   Component Value Date    TRIG 73 12/11/2017     Lab Results   Component Value Date    CHOLHDLRATIO 2.6 10/31/2015     Lab Results   Component Value Date    NHDL 84 12/11/2017       Is the patient on a Statin? YES             Is the patient on Aspirin? NO    Medications     HMG CoA Reductase Inhibitors    simvastatin (ZOCOR) 40 MG tablet          Last three blood pressure readings:  BP Readings from Last 3 Encounters:   10/02/18 122/81   08/06/18 116/74   05/11/18 120/76       Date of last diabetes office visit: 10/02/18     Tobacco History:     History   Smoking Status     Current Every Day Smoker     Years: 14.00     Types: Cigarettes     Last attempt to quit: 8/5/2010   Smokeless Tobacco     Never Used           Composite cancer screening  Chart review shows that this patient is due/due soon for the following None  Summary:    Patient is due/failing the following:   A1C and FOLLOW UP    Action needed:   Patient needs office visit for endo.    Type of outreach:    Sent 2d2c message.    Questions for provider review:    None                                                                                                                                    Kelly Rosas CMA       Chart routed to no one .

## 2019-02-18 ENCOUNTER — ALLIED HEALTH/NURSE VISIT (OUTPATIENT)
Dept: EDUCATION SERVICES | Facility: CLINIC | Age: 39
End: 2019-02-18
Payer: COMMERCIAL

## 2019-02-18 DIAGNOSIS — E10.21 TYPE 1 DIABETES MELLITUS WITH NEPHROPATHY (H): Primary | ICD-10-CM

## 2019-02-18 DIAGNOSIS — E10.65 TYPE 1 DIABETES MELLITUS WITH HYPERGLYCEMIA (H): ICD-10-CM

## 2019-02-18 PROCEDURE — G0108 DIAB MANAGE TRN  PER INDIV: HCPCS | Performed by: DIETITIAN, REGISTERED

## 2019-02-18 NOTE — PROGRESS NOTES
"Diabetes Self-Management Education & Support    Diabetes Self-Management Education & Support - Insulin Pump/CGM Review    SUBJECTIVE/OBJECTIVE  Diabetes education in the past 24mo: Yes  Diabetes type: Type 1  Disease course: Improving  Cultural Influences/Ethnic Background:  American    Patient comments: \"I love this pump- my blood sugars are getting so much better\"    Carelink reports:            Insulin Pump Review  Insulin Pump Type: Medtronic Minimed 670G System  Taking other diabetes medications?: No  Patient understands DKA prevention: Yes  Patient has an insulin multiple daily injection back-up plan: Yes  Adjustment for exercise: Consumes carbohydrate around time of exercise, occasionally uses temp target during extended periods of exercise  Patient would benefit from: Change in carbohydrate ratio(s), Change in basal rate(s), consistent use of a temp target during exercise  Changes made to pump settings: Carb ratio, Basal rate  Changes made to sensor settings: None  Education specific to insulin pump provided today: How to use a temporary basal rate  Attempting to begin Automode on 670G today?: (Continue Auto Mode)    Statistics/Data Evaluation:         Healthy Eating  Cultural/Worship diet restrictions?: No  Meal planning: Carbohydrate counting, Low salt, Plate planning method  Meals include: Lunch, Dinner  Beverages: Water, Coffee, Alcohol  Has patient met with a dietitian in the past?: No    Being Active  How intense was your typical exercise? : Moderate (like brisk walking)  Barrier to exercise: Other    Monitoring  Blood Glucose Meter: [employer]  Home Glucose (Sugar) Monitoring: Other  Blood glucose trend: Decreasing steadily  Low Glucose Range (mg/dL): <70  High Glucose Range (mg/dL): >200  Overall Range (mg/dL): 130-140      Lab Results   Component Value Date    A1C 8.5 10/02/2018    A1C 8.7 07/31/2018    A1C 9.1 05/07/2018       Taking Medications  Diabetes Medication(s)     Insulin Sig    insulin " degludec (TRESIBA FLEXTOUCH) 100 UNIT/ML pen Use for pump failure only. INJECT 40 UNITS SUBCUTANEOUS DAILY    insulin lispro (HUMALOG PEN) 100 UNIT/ML pen Use for pump failure/ DKA prevention only. Inject Subcutaneous 3 times daily (before meals). 1:10 carb +correction with every meal - estimated use 30 units    insulin lispro (HUMALOG VIAL) 100 UNIT/ML vial For use with insulin pump. Up to 65 units/day.    INSULIN PUMP - OUTPATIENT Date last updated:  01/21/19  Medtronic Minimed: Model 670G- Auto mode  BASAL RATES and times:  12 AM: 1.2 units/hour    9 AM: 1.0  1 PM: 0.9  6 PM 1.1  CARB RATIO and times:  12   AM (midnight): 8.0  10 AM:  9.5  3 PM: 8.0  Corection Factor (Sensitivity) and times:  12   AM (midnight): 40 mg/dL  BLOOD GLUCOSE TARGET and times:  12   AM (midnight): 120 - 120  Active Insulin Time:  3.0 hours  CareLink: yes        Current Treatments: Insulin Pump  Dose schedule: pre-breakfast, pre-lunch, pre-dinner, at bedtime  Given by: Patient  Injection/Infusion sites: Abdomen        Problem Solving  Hypoglycemia Frequency: Monthly  Hypoglycemia Treatment: Glucose (tablets or gel), Other food  Patient carries a carbohydrate source: Yes  Medical alert: No  Severe weather/disaster plan for diabetes management?: Yes  DKA prevention plan?: Yes  Sick day plan for diabetes management?: (P) Yes    Reducing Risks  CAD Risks: Family history  Has dilated eye exam at least once a year?: No  Sees dentist every 6 months?: No  Sees podiatrist (foot doctor)?: No    Healthy Coping  Informal Support system:: Family, Friends, Neighbors, Parent, Other  Difficulty affording diabetes management supplies?: No  Patient Activation Measure Survey Score:  ELLIE Score (Last Two) 1/29/2013 10/8/2013   ELLIE Raw Score 48 43   Activation Score 80 68.5   ELLIE Level 4 4       ASSESSMENT  Avg sensor glucose currently 156 mg/dl with SD of 56 mg/dl, indicating significant improvement in glycemic control since start of pump and CGM therapy. Pt  has also been making healthier food choices, getting more exercise, and cutting back on smoking. States week-ends are a challenge with carb heavy meals and snacks, but plans to focus greater attention on this.   Has somewhat of a pattern of post evening meal hyperglycemia. Pt may benefit from strengthening his I:C ratio at dinner time.   Also noted Auto mode basal insulin use is higher than manual mode. Though pt has been able to remain in Auto mode 96% of the time, suggest slight adjustment in manual settings.    INTERVENTION:   Diabetes knowledge and skills assessment:     Patient is knowledgeable in diabetes management concepts related to: Healthy Eating, Being Active, Monitoring, Taking Medication, Problem Solving, Reducing Risks and Healthy Coping    Patient needs further education on the following diabetes management concepts: Being Active and Problem Solving    Based on learning assessment above, most appropriate setting for further diabetes education would be: Individual setting.    Education provided today on:  AADE Self-Care Behaviors:  Being Active: relationship to blood glucose and precautions to take  Problem Solving: high blood glucose - causes, signs/symptoms, treatment and prevention, low blood glucose - causes, signs/symptoms, treatment and prevention and when to call health care provider    Education specific to pump therapy:  treating hypoglycemia correctly while in Auto mode, adjustments for auto mode vs manual mode    Opportunities for ongoing education and support in diabetes-self management were discussed.    Pt verbalized understanding of concepts discussed and recommendations provided today.       Education Materials Provided:  No new materials provided today      PLAN  Strengthen I:C ratio at 3 pm from 8.0 --> to 7.5  Continue Auto mode use. Increase manual basal setting at 6 pm - 12 am from 1.1 --> 1.2.   Consider more consistent use of Temp target for exercise (usual target 120 --> change  to 150 for exercise)  Upload to [facility] in 2 weeks, or sooner if needed.   CDE follow up every 3 months.     Brianna Ramirez RD, CDE  Diabetes     Time Spent: 60 minutes  Encounter Type: Individual    Any diabetes medication dose changes were made via the CDE Protocol and Collaborative Practice Agreement with the patient's primary care provider. A copy of this encounter was shared with the provider.

## 2019-02-18 NOTE — LETTER
"    2/18/2019         RE: Ge Medellin  [address] [place] [address]        Dear Colleague,    Thank you for referring your patient, Ge Medellin, to the [facility]. Please see a copy of my visit note below.    Diabetes Self-Management Education & Support    Diabetes Self-Management Education & Support - Insulin Pump/CGM Review    SUBJECTIVE/OBJECTIVE  Diabetes education in the past 24mo: Yes  Diabetes type: Type 1  Disease course: Improving  Cultural Influences/Ethnic Background:  American    Patient comments: \"I love this pump- my blood sugars are getting so much better\"    Carelink reports:            Insulin Pump Review  Insulin Pump Type: Medtronic Minimed 670G System  Taking other diabetes medications?: No  Patient understands DKA prevention: Yes  Patient has an insulin multiple daily injection back-up plan: Yes  Adjustment for exercise: Consumes carbohydrate around time of exercise, occasionally uses temp target during extended periods of exercise  Patient would benefit from: Change in carbohydrate ratio(s), Change in basal rate(s), consistent use of a temp target during exercise  Changes made to pump settings: Carb ratio, Basal rate  Changes made to sensor settings: None  Education specific to insulin pump provided today: How to use a temporary basal rate  Attempting to begin Automode on 670G today?: (Continue Auto Mode)    Statistics/Data Evaluation:         Healthy Eating  Cultural/Denominational diet restrictions?: No  Meal planning: Carbohydrate counting, Low salt, Plate planning method  Meals include: Lunch, Dinner  Beverages: Water, Coffee, Alcohol  Has patient met with a dietitian in the past?: No    Being Active  How intense was your typical exercise? : Moderate (like brisk walking)  Barrier to exercise: Other    Monitoring  Blood Glucose Meter: [employer]  Home Glucose (Sugar) Monitoring: Other  Blood glucose trend: Decreasing steadily  Low Glucose Range (mg/dL): " <70  High Glucose Range (mg/dL): >200  Overall Range (mg/dL): 130-140      Lab Results   Component Value Date    A1C 8.5 10/02/2018    A1C 8.7 07/31/2018    A1C 9.1 05/07/2018       Taking Medications  Diabetes Medication(s)     Insulin Sig    insulin degludec (TRESIBA FLEXTOUCH) 100 UNIT/ML pen Use for pump failure only. INJECT 40 UNITS SUBCUTANEOUS DAILY    insulin lispro (HUMALOG PEN) 100 UNIT/ML pen Use for pump failure/ DKA prevention only. Inject Subcutaneous 3 times daily (before meals). 1:10 carb +correction with every meal - estimated use 30 units    insulin lispro (HUMALOG VIAL) 100 UNIT/ML vial For use with insulin pump. Up to 65 units/day.    INSULIN PUMP - OUTPATIENT Date last updated:  01/21/19  Medtronic Minimed: Model 670G- Auto mode  BASAL RATES and times:  12 AM: 1.2 units/hour    9 AM: 1.0  1 PM: 0.9  6 PM 1.1  CARB RATIO and times:  12   AM (midnight): 8.0  10 AM:  9.5  3 PM: 8.0  Corection Factor (Sensitivity) and times:  12   AM (midnight): 40 mg/dL  BLOOD GLUCOSE TARGET and times:  12   AM (midnight): 120 - 120  Active Insulin Time:  3.0 hours  CareLink: yes        Current Treatments: Insulin Pump  Dose schedule: pre-breakfast, pre-lunch, pre-dinner, at bedtime  Given by: Patient  Injection/Infusion sites: Abdomen        Problem Solving  Hypoglycemia Frequency: Monthly  Hypoglycemia Treatment: Glucose (tablets or gel), Other food  Patient carries a carbohydrate source: Yes  Medical alert: No  Severe weather/disaster plan for diabetes management?: Yes  DKA prevention plan?: Yes  Sick day plan for diabetes management?: (P) Yes    Reducing Risks  CAD Risks: Family history  Has dilated eye exam at least once a year?: No  Sees dentist every 6 months?: No  Sees podiatrist (foot doctor)?: No    Healthy Coping  Informal Support system:: Family, Friends, Neighbors, Parent, Other  Difficulty affording diabetes management supplies?: No  Patient Activation Measure Survey Score:  ELLIE Score (Last Two)  1/29/2013 10/8/2013   ELLIE Raw Score 48 43   Activation Score 80 68.5   ELLIE Level 4 4       ASSESSMENT  Avg sensor glucose currently 156 mg/dl with SD of 56 mg/dl, indicating significant improvement in glycemic control since start of pump and CGM therapy. Pt has also been making healthier food choices, getting more exercise, and cutting back on smoking. States week-ends are a challenge with carb heavy meals and snacks, but plans to focus greater attention on this.   Has somewhat of a pattern of post evening meal hyperglycemia. Pt may benefit from strengthening his I:C ratio at dinner time.   Also noted Auto mode basal insulin use is higher than manual mode. Though pt has been able to remain in Auto mode 96% of the time, suggest slight adjustment in manual settings.    INTERVENTION:   Diabetes knowledge and skills assessment:     Patient is knowledgeable in diabetes management concepts related to: Healthy Eating, Being Active, Monitoring, Taking Medication, Problem Solving, Reducing Risks and Healthy Coping    Patient needs further education on the following diabetes management concepts: Being Active and Problem Solving    Based on learning assessment above, most appropriate setting for further diabetes education would be: Individual setting.    Education provided today on:  AADE Self-Care Behaviors:  Being Active: relationship to blood glucose and precautions to take  Problem Solving: high blood glucose - causes, signs/symptoms, treatment and prevention, low blood glucose - causes, signs/symptoms, treatment and prevention and when to call health care provider    Education specific to pump therapy:  treating hypoglycemia correctly while in Auto mode, adjustments for auto mode vs manual mode    Opportunities for ongoing education and support in diabetes-self management were discussed.    Pt verbalized understanding of concepts discussed and recommendations provided today.       Education Materials Provided:  No new  materials provided today      PLAN  Strengthen I:C ratio at 3 pm from 8.0 --> to 7.5  Continue Auto mode use. Increase manual basal setting at 6 pm - 12 am from 1.1 --> 1.2.   Consider more consistent use of Temp target for exercise (usual target 120 --> change to 150 for exercise)  Upload to [facility] in 2 weeks, or sooner if needed.   CDE follow up every 3 months.     Brianna Ramirez RD, CDE  Diabetes     Time Spent: 60 minutes  Encounter Type: Individual    Any diabetes medication dose changes were made via the CDE Protocol and Collaborative Practice Agreement with the patient's primary care provider. A copy of this encounter was shared with the provider.

## 2019-02-18 NOTE — Clinical Note
Hello!Just fyi- progress note on Ge,  doing really well on his new Medtronic insulin pump/CGM. Thanks!Brianna Ramirez RD, CDEDiabetes

## 2019-03-18 ENCOUNTER — DOCUMENTATION ONLY (OUTPATIENT)
Dept: PEDIATRICS | Facility: CLINIC | Age: 39
End: 2019-03-18

## 2019-03-18 DIAGNOSIS — E10.21 TYPE 1 DIABETES MELLITUS WITH NEPHROPATHY (H): Primary | ICD-10-CM

## 2019-03-18 NOTE — PROGRESS NOTES
Ge has a lab only 3/19 and no open orders. Please place any future orders needed.     Thanks,  Lab

## 2019-03-20 ENCOUNTER — OFFICE VISIT (OUTPATIENT)
Dept: PEDIATRICS | Facility: CLINIC | Age: 39
End: 2019-03-20
Payer: COMMERCIAL

## 2019-03-20 VITALS
DIASTOLIC BLOOD PRESSURE: 78 MMHG | OXYGEN SATURATION: 98 % | TEMPERATURE: 97.9 F | HEIGHT: 72 IN | SYSTOLIC BLOOD PRESSURE: 118 MMHG | WEIGHT: 203 LBS | HEART RATE: 71 BPM | BODY MASS INDEX: 27.5 KG/M2

## 2019-03-20 DIAGNOSIS — Z72.0 TOBACCO ABUSE: ICD-10-CM

## 2019-03-20 DIAGNOSIS — E78.5 HYPERLIPIDEMIA LDL GOAL <100: ICD-10-CM

## 2019-03-20 DIAGNOSIS — E10.21 TYPE 1 DIABETES MELLITUS WITH NEPHROPATHY (H): Primary | ICD-10-CM

## 2019-03-20 LAB — HBA1C MFR BLD: 7.1 % (ref 0–5.6)

## 2019-03-20 PROCEDURE — 80053 COMPREHEN METABOLIC PANEL: CPT | Performed by: PEDIATRICS

## 2019-03-20 PROCEDURE — 83721 ASSAY OF BLOOD LIPOPROTEIN: CPT | Performed by: PEDIATRICS

## 2019-03-20 PROCEDURE — 82043 UR ALBUMIN QUANTITATIVE: CPT | Performed by: PEDIATRICS

## 2019-03-20 PROCEDURE — 99214 OFFICE O/P EST MOD 30 MIN: CPT | Performed by: PEDIATRICS

## 2019-03-20 PROCEDURE — 36415 COLL VENOUS BLD VENIPUNCTURE: CPT | Performed by: PEDIATRICS

## 2019-03-20 PROCEDURE — 99207 C FOOT EXAM  NO CHARGE: CPT | Performed by: PEDIATRICS

## 2019-03-20 PROCEDURE — 83036 HEMOGLOBIN GLYCOSYLATED A1C: CPT | Performed by: PEDIATRICS

## 2019-03-20 ASSESSMENT — MIFFLIN-ST. JEOR: SCORE: 1883.8

## 2019-03-20 NOTE — PATIENT INSTRUCTIONS
Schedule diabetes follow ups with Dr Reyna now that you have the pump sometime in the next few months    Labs today - I'll send you the results through [employer]    Keep up the incredible work with running!    Keep cutting down on the cigarettes - let me know if I can help at all

## 2019-03-20 NOTE — PROGRESS NOTES
SUBJECTIVE:   Ge Medellin is a 38 year old male who presents to clinic today for the following health issues:      Diabetes Follow-up      Patient is checking blood sugars: rarely.  Results 162 average     Diabetic concerns: None     Symptoms of hypoglycemia (low blood sugar): shaky     Paresthesias (numbness or burning in feet) or sores: No     Date of last diabetic eye exam: pt due - informed     BP Readings from Last 2 Encounters:   03/20/19 118/78   10/02/18 122/81     Hemoglobin A1C (%)   Date Value   10/02/2018 8.5 (H)   07/31/2018 8.7 (H)     LDL Cholesterol Calculated (mg/dL)   Date Value   12/11/2017 69   10/05/2016 90       Since her last visit, patient started on a Medtronic insulin pump.  His blood sugars have been under better control than they have been for years and is very pleased.  He reports no dangerous lows.  He is also really recently started running.  Advised more frequent exercise, his blood sugars have improved even further and he is thrilled with this.  He reports having glucagon available at home and has instructed his roommates on how to take care of him should he have a low blood sugar.  He is following closely with our diabetes educator and is due for follow-up also with his endocrinologist, Dr. Reyna.    Diabetes Management Resources  Hyperlipidemia Follow-Up      Rate your low fat/cholesterol diet?: good    Taking statin?  Yes, no muscle aches from statin    Other lipid medications/supplements?:  none      Amount of exercise or physical activity: None    Problems taking medications regularly: No    Medication side effects: none    Diet: low fat/cholesterol and diabetic        Smoking - down to 3-4 cigs/day on days off and 8 cigs on work days.  Has cut down on smoking, but does not yet feel confident in quitting.      Problem list and histories reviewed & adjusted, as indicated.  Additional history: as documented      Reviewed and updated as needed this visit by clinical  staff  Tobacco  Allergies  Meds  Med Hx  Surg Hx  Fam Hx  Soc Hx      Reviewed and updated as needed this visit by Provider         ROS:  Constitutional, endo, cardiovascular, pulmonary, gi  systems are negative, except as otherwise noted.    OBJECTIVE:     /78 (BP Location: Right arm, Patient Position: Right side, Cuff Size: Adult Large)   Pulse 71   Temp 97.9  F (36.6  C) (Tympanic)   Ht 1.829 m (6')   Wt 92.1 kg (203 lb)   SpO2 98%   BMI 27.53 kg/m    Body mass index is 27.53 kg/m .  GENERAL: healthy, alert and no distress  RESP: lungs clear to auscultation - no rales, rhonchi or wheezes  CV: regular rate and rhythm, normal S1 S2, no S3 or S4, no murmur, click or rub, no peripheral edema and peripheral pulses strong  ABDOMEN: insulin pump insertion site well appearing without drainage or erythema  PSYCH: mentation appears normal, affect normal/bright  Diabetic foot exam: normal DP and PT pulses, no trophic changes or ulcerative lesions and normal sensory exam    Diagnostic Test Results:  pending    ASSESSMENT/PLAN:         ICD-10-CM    1. Type 1 diabetes mellitus with nephropathy, goal A1C < 7% E10.21 Hemoglobin A1c     Comprehensive metabolic panel     Albumin Random Urine Quantitative with Creat Ratio     FOOT EXAM     LDL cholesterol direct    Under much improved control with insulin pump.   Due for follow up with Dr Reyna.  Continue with excellent exercise.   2. Tobacco abuse Z72.0 Encouraged to continue efforts to quit smoking - will alert me if I can prescribe a medication that would be helpful.   3. Hyperlipidemia LDL goal <100 E78.5 Recheck LDL today, on statin       See Patient Instructions    Lorelei Louis MD  [facility]

## 2019-03-21 ENCOUNTER — PATIENT OUTREACH (OUTPATIENT)
Dept: EDUCATION SERVICES | Facility: CLINIC | Age: 39
End: 2019-03-21
Payer: COMMERCIAL

## 2019-03-21 ENCOUNTER — MYC MEDICAL ADVICE (OUTPATIENT)
Dept: EDUCATION SERVICES | Facility: CLINIC | Age: 39
End: 2019-03-21

## 2019-03-21 LAB
ALBUMIN SERPL-MCNC: 4.2 G/DL (ref 3.4–5)
ALP SERPL-CCNC: 61 U/L (ref 40–150)
ALT SERPL W P-5'-P-CCNC: 27 U/L (ref 0–70)
ANION GAP SERPL CALCULATED.3IONS-SCNC: 5 MMOL/L (ref 3–14)
AST SERPL W P-5'-P-CCNC: 34 U/L (ref 0–45)
BILIRUB SERPL-MCNC: 0.5 MG/DL (ref 0.2–1.3)
BUN SERPL-MCNC: 14 MG/DL (ref 7–30)
CALCIUM SERPL-MCNC: 9.1 MG/DL (ref 8.5–10.1)
CHLORIDE SERPL-SCNC: 106 MMOL/L (ref 94–109)
CO2 SERPL-SCNC: 29 MMOL/L (ref 20–32)
CREAT SERPL-MCNC: 0.9 MG/DL (ref 0.66–1.25)
CREAT UR-MCNC: 56 MG/DL
GFR SERPL CREATININE-BSD FRML MDRD: >90 ML/MIN/{1.73_M2}
GLUCOSE SERPL-MCNC: 113 MG/DL (ref 70–99)
LDLC SERPL DIRECT ASSAY-MCNC: 75 MG/DL
MICROALBUMIN UR-MCNC: <5 MG/L
MICROALBUMIN/CREAT UR: NORMAL MG/G CR (ref 0–17)
POTASSIUM SERPL-SCNC: 4.4 MMOL/L (ref 3.4–5.3)
PROT SERPL-MCNC: 7.4 G/DL (ref 6.8–8.8)
SODIUM SERPL-SCNC: 140 MMOL/L (ref 133–144)

## 2019-03-21 NOTE — PROGRESS NOTES
Diabetes Self-Management Training: Insulin Pump MyChart Visit    Patient uploaded 670G pump for review. See MyChart message- concerned about going low after running, and sometimes going high at night.    Current Diabetes Treatment:    Diabetes Medication(s)     Insulin       insulin degludec (TRESIBA FLEXTOUCH) 100 UNIT/ML pen    Use for pump failure only. INJECT 40 UNITS SUBCUTANEOUS DAILY     insulin lispro (HUMALOG PEN) 100 UNIT/ML pen    Use for pump failure/ DKA prevention only. Inject Subcutaneous 3 times daily (before meals). 1:10 carb +correction with every meal - estimated use 30 units     insulin lispro (HUMALOG VIAL) 100 UNIT/ML vial    For use with insulin pump. Up to 65 units/day.     INSULIN PUMP - OUTPATIENT    Date last updated:  01/21/19  Medtronic Minimed: Model 670G- Auto mode  BASAL RATES and times:  12 AM: 1.2 units/hour    9 AM: 1.0  1 PM: 0.9  6 PM 1.2  CARB RATIO and times:  12   AM (midnight): 8.0  10 AM:  9.5  3 PM: 7.5  Corection Factor (Sensitivity) and times:  12   AM (midnight): 40 mg/dL  BLOOD GLUCOSE TARGET and times:  12   AM (midnight): 120 - 120  Active Insulin Time:  3.0 hours  CareLink: yes          CareLink Report:              Current Pump Settings:           Assessment:  In Auto mode 98% of the time, Avg glucose 155, SD 52  A1c improved since recent start of pump therapy    Lab Results   Component Value Date    A1C 7.1 03/20/2019    A1C 8.5 10/02/2018       Patient may benefit from:  1. Increase in carbohydrate ratio for evening meal to help prevent stop of auto basal, which appears to cause rebound hyperglycemia later in the evening/ night.    2. Slight increase in insulin action time to prevent over-correction of high BG   3. Using a Temp Target of 150 mg/dl before/during/after exercise  4. Calibrate sensor when BG stable    Intervention/Plan:  Changes made to pump settings:  Carb ratio at 3:00 pm: change from 7.5 --> 7.7  Active insulin time: change from 3.0 --> 3.25  Use  temp target for exercise- start 1 hr before exercise and extend 1 hr post exercise    Follow-up:  MyChart communication sent to patient.  Upload to [facility] in 2 weeks.  Recommend CDE follow up 4-6 weeks.    Brianna Ramirez RD, CDE  Diabetes     Any diabetes medication dose changes were made via the CDE Protocol and Collaborative Practice Agreement with the patient's endocrinology provider. A copy of this encounter was shared with the provider.

## 2019-04-24 ENCOUNTER — MYC MEDICAL ADVICE (OUTPATIENT)
Dept: PEDIATRICS | Facility: CLINIC | Age: 39
End: 2019-04-24

## 2019-04-24 NOTE — LETTER
April 25, 2019      Ge Medellin  [address]  [place] [address]        To Whom It May Concern,       Ge is under my professional care.  He is Diabetic and has my approval to go rodríguez diving.      Sincerely,        Lorelei Louis MD

## 2019-04-25 NOTE — TELEPHONE ENCOUNTER
Pt sent a [employer] message requesting a letter stating that it is ok for him to go skydiving due to being Diabetic.    Letter teed up-    Please advise-Donna Fuentes RN - Triage  [employer]

## 2019-04-25 NOTE — TELEPHONE ENCOUNTER
Forwarded to Dr Reyna for her review and opinion regarding if there are typical policies in place for Type 1 diabetics and skydiving.      Lorelei Louis MD  Internal Medicine - Pediatrics

## 2019-05-22 ENCOUNTER — MYC MEDICAL ADVICE (OUTPATIENT)
Dept: ENDOCRINOLOGY | Facility: CLINIC | Age: 39
End: 2019-05-22

## 2019-05-22 NOTE — TELEPHONE ENCOUNTER
Message sent via [employer].      Kelly Rosas CMA  [facility]  [place]

## 2019-05-22 NOTE — LETTER
[facility]  [address], [address]  [place], [place]  [address]                                            TEL:[phone]  FAX:[phone]      Ge Medellin  [address] [facility] [address]      May 22, 2019    To Whom It May Concern:    Ge Medellin is under my care for his type 1 diabetes.   Ge is medically cleared to skive as long as he follows the instructions that I have provided him earlier.  Please call endocrinology clinic (nurse line: [phone]) if questions.        Sincerely,      Dr. Marline Reyna

## 2019-05-22 NOTE — TELEPHONE ENCOUNTER
Primary care provider please see message below. This is in relation to 4/24/19 Mychart encounter.

## 2019-06-21 DIAGNOSIS — E10.65 TYPE 1 DIABETES MELLITUS WITH HYPERGLYCEMIA (H): ICD-10-CM

## 2019-06-21 DIAGNOSIS — E78.5 HYPERLIPIDEMIA LDL GOAL <100: ICD-10-CM

## 2019-06-21 DIAGNOSIS — R80.9 TYPE 1 DIABETES MELLITUS WITH MICROALBUMINURIA (H): ICD-10-CM

## 2019-06-21 DIAGNOSIS — E10.29 TYPE 1 DIABETES MELLITUS WITH MICROALBUMINURIA (H): ICD-10-CM

## 2019-06-21 NOTE — TELEPHONE ENCOUNTER
"Requested Prescriptions   Pending Prescriptions Disp Refills     lisinopril (PRINIVIL/ZESTRIL) 2.5 MG tablet [Pharmacy Med Name: LISINOPRIL 2.5MG    TAB]  Last Written Prescription Date:  08/06/2018  Last Fill Quantity: 90 tablet,  # refills: 3   Last Office Visit: 3/20/2019 Lorelei Louis MD   Future Office Visit:      90 tablet 3     Sig: TAKE 1 TABLET BY MOUTH ONCE DAILY       ACE Inhibitors (Including Combos) Protocol Passed - 6/21/2019 11:22 AM        Passed - Blood pressure under 140/90 in past 12 months     BP Readings from Last 3 Encounters:   03/20/19 118/78   10/02/18 122/81   08/06/18 116/74                 Passed - Recent (12 mo) or future (30 days) visit within the authorizing provider's specialty     Patient had office visit in the last 12 months or has a visit in the next 30 days with authorizing provider or within the authorizing provider's specialty.  See \"Patient Info\" tab in inbasket, or \"Choose Columns\" in Meds & Orders section of the refill encounter.              Passed - Medication is active on med list        Passed - Patient is age 18 or older        Passed - Normal serum creatinine on file in past 12 months     Recent Labs   Lab Test 03/20/19  1157   CR 0.90             Passed - Normal serum potassium on file in past 12 months     Recent Labs   Lab Test 03/20/19  1157   POTASSIUM 4.4             simvastatin (ZOCOR) 40 MG tablet [Pharmacy Med Name: SIMVASTATIN 40MG    TAB]  Last Written Prescription Date:  08/06/2018  Last Fill Quantity: 90 tablet,  # refills: 3   Last Office Visit: 3/20/2019 Lorelei Louis MD   Future Office Visit:      90 tablet 3     Sig: TAKE 1 TABLET BY MOUTH AT BEDTIME       Statins Protocol Passed - 6/21/2019 11:22 AM        Passed - LDL on file in past 12 months     Recent Labs   Lab Test 03/20/19  1157   LDL 75             Passed - No abnormal creatine kinase in past 12 months     No lab results found.             Passed - Recent (12 mo) or future (30 " "days) visit within the authorizing provider's specialty     Patient had office visit in the last 12 months or has a visit in the next 30 days with authorizing provider or within the authorizing provider's specialty.  See \"Patient Info\" tab in inbasket, or \"Choose Columns\" in Meds & Orders section of the refill encounter.              Passed - Medication is active on med list        Passed - Patient is age 18 or older          "

## 2019-06-23 RX ORDER — LISINOPRIL 2.5 MG/1
TABLET ORAL
Qty: 90 TABLET | Refills: 0 | Status: SHIPPED | OUTPATIENT
Start: 2019-06-23 | End: 2019-10-23

## 2019-06-23 RX ORDER — SIMVASTATIN 40 MG
TABLET ORAL
Qty: 90 TABLET | Refills: 1 | Status: SHIPPED | OUTPATIENT
Start: 2019-06-23 | End: 2019-10-23

## 2019-06-24 NOTE — TELEPHONE ENCOUNTER
Prescription approved per [facility] [place] Refill Protocol.  Shari Montero RN

## 2019-07-16 ENCOUNTER — TELEPHONE (OUTPATIENT)
Dept: ENDOCRINOLOGY | Facility: CLINIC | Age: 39
End: 2019-07-16

## 2019-07-16 NOTE — TELEPHONE ENCOUNTER
Panel Management Review      Patient has the following on his problem list:   Diabetes    ASA: Passed    Last A1C  Lab Results   Component Value Date    A1C 7.1 03/20/2019    A1C 8.5 10/02/2018    A1C 8.7 07/31/2018    A1C 9.1 05/07/2018    A1C 9.0 02/05/2018     A1C tested: FAILED    Last LDL:    Lab Results   Component Value Date    CHOL 144 12/11/2017     Lab Results   Component Value Date    HDL 60 12/11/2017     Lab Results   Component Value Date    LDL 75 03/20/2019    LDL 69 12/11/2017     Lab Results   Component Value Date    TRIG 73 12/11/2017     Lab Results   Component Value Date    CHOLHDLRATIO 2.6 10/31/2015     Lab Results   Component Value Date    NHDL 84 12/11/2017       Is the patient on a Statin? YES             Is the patient on Aspirin? NO    Medications     HMG CoA Reductase Inhibitors     simvastatin (ZOCOR) 40 MG tablet             Last three blood pressure readings:  BP Readings from Last 3 Encounters:   03/20/19 118/78   10/02/18 122/81   08/06/18 116/74       Date of last diabetes office visit: 10/02/18     Tobacco History:     History   Smoking Status     Current Every Day Smoker     Years: 14.00     Types: Cigarettes     Last attempt to quit: 8/5/2010   Smokeless Tobacco     Never Used           Composite cancer screening  Chart review shows that this patient is due/due soon for the following None  Summary:    Patient is due/failing the following:   A1C and FOLLOW UP    Action needed:   Patient needs office visit for endo.    Type of outreach:    Sent [facility] message.    Questions for provider review:    None                                                                                                                                    Kelly Rosas CMA  [facility]  [place]       Chart routed to no one .

## 2019-08-03 ENCOUNTER — NURSE TRIAGE (OUTPATIENT)
Dept: NURSING | Facility: CLINIC | Age: 39
End: 2019-08-03

## 2019-08-03 NOTE — TELEPHONE ENCOUNTER
"Michael from [facility] pharmacy in [place] calling to report patient is almost out of his Humalog insulin vial.     insulin lispro (HUMALOG VIAL) 100 UNIT/ML vial 20 mL 6 12/19/2018  --   Sig: For use with insulin pump. Up to 65 units/day.     [facility] paged on call provider for [facility], (covering for endocrine), Jia Gage MD via smart web at 4:11 pm to call RN back directly at [phone].    Provider okay for 1 refill of medication and for patient to follow up with endocrine doctor for further refills. [facility] called pharmacist to let them know.     Archana Ibrahim RN/[facility]    Reason for Disposition    [1] Request for URGENT new prescription or refill of \"essential\" medication (i.e., likelihood of harm to patient if not taken) AND [2] triager unable to fill per unit policy    Protocols used: MEDICATION QUESTION CALL-A-AH      "

## 2019-08-03 NOTE — TELEPHONE ENCOUNTER
Reason for call;  [facility]  Pharmacist nida Rodriguez called .   Calling for refill for lispor humalog pen  Order by Diabetic educator.    Recommendation / teaching ; @ 1144am smart web paged Dr Jia Gage to call back pharmacist Myriam for refill of Humalog insulin pen for insulin pump failure.      Caller Verbalizes understanding and denies further questions and will call back if further symptoms to triage or questions   Or if no response to page in timely way.   Rayna Donato RN  - [facility]

## 2019-08-05 DIAGNOSIS — E10.65 TYPE 1 DIABETES MELLITUS WITH HYPERGLYCEMIA (H): ICD-10-CM

## 2019-08-05 NOTE — TELEPHONE ENCOUNTER
"Requested Prescriptions   Pending Prescriptions Disp Refills     insulin lispro (HUMALOG VIAL) 100 UNIT/ML vial 20 mL 6     Sig: For use with insulin pump. Up to 65 units/day.   Last Written Prescription Date:  12/19/18  Last Fill Quantity: 20,  # refills: 6   Last office visit: 10/2/2018 with prescribing provider:  yes   Future Office Visit:        Short Acting Insulin Protocol Failed - 8/5/2019 10:45 AM        Failed - Recent (6 mo) or future (30 days) visit within the authorizing provider's specialty     Patient had office visit in the last 6 months or has a visit in the next 30 days with authorizing provider or within the authorizing provider's specialty.  See \"Patient Info\" tab in inbasket, or \"Choose Columns\" in Meds & Orders section of the refill encounter.            Passed - Blood pressure less than 140/90 in past 6 months     BP Readings from Last 3 Encounters:   03/20/19 118/78   10/02/18 122/81   08/06/18 116/74                 Passed - LDL on file in past 12 months     Recent Labs   Lab Test 03/20/19  1157   LDL 75             Passed - Microalbumin on file in past 12 months     Recent Labs   Lab Test 03/20/19  1157   MICROL <5   UMALCR Unable to calculate due to low value             Passed - Serum creatinine on file in past 12 months     Recent Labs   Lab Test 03/20/19  1157   CR 0.90             Passed - HgbA1C in past 3 or 6 months     If HgbA1C is 8 or greater, it needs to be on file within the past 3 months.  If less than 8, must be on file within the past 6 months.     Recent Labs   Lab Test 03/20/19  1157   A1C 7.1*             Passed - Medication is active on med list        Passed - Patient is age 18 or older          "

## 2019-08-06 NOTE — TELEPHONE ENCOUNTER
Routing refill request to provider for review/approval because:  Has not had appointment in the last 6 months

## 2019-08-06 NOTE — TELEPHONE ENCOUNTER
Limited amount of Rx sent. No refills.  This is an exception  Patient is due for labs and clinic visit  I am sorry but I will not be able to refill further prescription at this time until he makes a clinic visit.  Please inform patient.    Please help schedule clinic appointment.       Lab Results   Component Value Date    A1C 7.1 03/20/2019    A1C 8.5 10/02/2018    A1C 8.7 07/31/2018    A1C 9.1 05/07/2018    A1C 9.0 02/05/2018

## 2019-08-21 DIAGNOSIS — E10.21 TYPE 1 DIABETES MELLITUS WITH NEPHROPATHY (H): ICD-10-CM

## 2019-08-21 LAB
ALBUMIN SERPL-MCNC: 4.2 G/DL (ref 3.4–5)
ALP SERPL-CCNC: 60 U/L (ref 40–150)
ALT SERPL W P-5'-P-CCNC: 35 U/L (ref 0–70)
ANION GAP SERPL CALCULATED.3IONS-SCNC: 8 MMOL/L (ref 3–14)
AST SERPL W P-5'-P-CCNC: 36 U/L (ref 0–45)
BILIRUB SERPL-MCNC: 0.6 MG/DL (ref 0.2–1.3)
BUN SERPL-MCNC: 17 MG/DL (ref 7–30)
CALCIUM SERPL-MCNC: 9.5 MG/DL (ref 8.5–10.1)
CHLORIDE SERPL-SCNC: 106 MMOL/L (ref 94–109)
CHOLEST SERPL-MCNC: 143 MG/DL
CO2 SERPL-SCNC: 27 MMOL/L (ref 20–32)
CREAT SERPL-MCNC: 0.88 MG/DL (ref 0.66–1.25)
GFR SERPL CREATININE-BSD FRML MDRD: >90 ML/MIN/{1.73_M2}
GLUCOSE SERPL-MCNC: 121 MG/DL (ref 70–99)
HBA1C MFR BLD: 6.7 % (ref 0–5.6)
HDLC SERPL-MCNC: 67 MG/DL
LDLC SERPL CALC-MCNC: 65 MG/DL
NONHDLC SERPL-MCNC: 76 MG/DL
POTASSIUM SERPL-SCNC: 4.5 MMOL/L (ref 3.4–5.3)
PROT SERPL-MCNC: 7.5 G/DL (ref 6.8–8.8)
SODIUM SERPL-SCNC: 141 MMOL/L (ref 133–144)
TRIGL SERPL-MCNC: 54 MG/DL

## 2019-08-21 PROCEDURE — 83036 HEMOGLOBIN GLYCOSYLATED A1C: CPT | Performed by: PEDIATRICS

## 2019-08-21 PROCEDURE — 36415 COLL VENOUS BLD VENIPUNCTURE: CPT | Performed by: PEDIATRICS

## 2019-08-21 PROCEDURE — 80061 LIPID PANEL: CPT | Performed by: PEDIATRICS

## 2019-08-21 PROCEDURE — 80053 COMPREHEN METABOLIC PANEL: CPT | Performed by: PEDIATRICS

## 2019-10-14 ENCOUNTER — MYC MEDICAL ADVICE (OUTPATIENT)
Dept: ENDOCRINOLOGY | Facility: CLINIC | Age: 39
End: 2019-10-14

## 2019-10-14 DIAGNOSIS — E10.65 TYPE 1 DIABETES MELLITUS WITH HYPERGLYCEMIA (H): ICD-10-CM

## 2019-10-14 NOTE — TELEPHONE ENCOUNTER
"Requested Prescriptions   Pending Prescriptions Disp Refills     insulin lispro (HUMALOG) 100 UNIT/ML vial [Pharmacy Med Name: HUMALOG 100UNIT/ML  INJ]  1     Sig: FOR USE WITH INSULIN PUMP. UP TO 65 UNITS PER DAY   Last Written Prescription Date:  08/06/19  Last Fill Quantity: 20,  # refills: 1   Last office visit: 10/2/2018 with prescribing provider:  yes   Future Office Visit:        Short Acting Insulin Protocol Failed - 10/14/2019  9:47 AM        Failed - Blood pressure less than 140/90 in past 6 months     BP Readings from Last 3 Encounters:   03/20/19 118/78   10/02/18 122/81   08/06/18 116/74                 Failed - Recent (6 mo) or future (30 days) visit within the authorizing provider's specialty     Patient had office visit in the last 6 months or has a visit in the next 30 days with authorizing provider or within the authorizing provider's specialty.  See \"Patient Info\" tab in inbasket, or \"Choose Columns\" in Meds & Orders section of the refill encounter.            Passed - LDL on file in past 12 months     Recent Labs   Lab Test 08/21/19  0705   LDL 65             Passed - Microalbumin on file in past 12 months     Recent Labs   Lab Test 03/20/19  1157   MICROL <5   UMALCR Unable to calculate due to low value             Passed - Serum creatinine on file in past 12 months     Recent Labs   Lab Test 08/21/19  0705   CR 0.88             Passed - HgbA1C in past 3 or 6 months     If HgbA1C is 8 or greater, it needs to be on file within the past 3 months.  If less than 8, must be on file within the past 6 months.     Recent Labs   Lab Test 08/21/19  0705   A1C 6.7*             Passed - Medication is active on med list        Passed - Patient is age 18 or older          "

## 2019-10-14 NOTE — TELEPHONE ENCOUNTER
Last visit 10/2018  Limited amount of Rx sent. No refills.  This is an exception  Patient is due for labs and clinic visit  I am sorry but I will not be able to refill further prescription at this time until he makes a clinic visit.  Please inform patient.    Please help schedule clinic appointment.      Endo staff- Please check schedule to see if we have sooner appointment/cancellations in next few weeks.    Let me know if you have any questions.    Thank you.    Marline Reyna MD      Lab Results   Component Value Date    A1C 6.7 08/21/2019    A1C 7.1 03/20/2019    A1C 8.5 10/02/2018    A1C 8.7 07/31/2018    A1C 9.1 05/07/2018

## 2019-10-16 ENCOUNTER — TELEPHONE (OUTPATIENT)
Dept: ENDOCRINOLOGY | Facility: CLINIC | Age: 39
End: 2019-10-16

## 2019-10-17 NOTE — TELEPHONE ENCOUNTER
Forms/paperwork reviewed, completed and signed.  Please fax or send the papers as requested, document in chart and close the encounter.    Thank you.    Marline Reyna MD    Last visit 10/2018  Pt due for OV. Please send reminder letter.    Lab Results   Component Value Date    A1C 6.7 08/21/2019    A1C 7.1 03/20/2019    A1C 8.5 10/02/2018    A1C 8.7 07/31/2018    A1C 9.1 05/07/2018

## 2019-10-17 NOTE — TELEPHONE ENCOUNTER
My chart message sent on 10/14/19.    Form was faxed, copied, sent to scanning.      Kelly Rosas [facility]  [place]

## 2019-10-17 NOTE — TELEPHONE ENCOUNTER
LAST OFFICE/VIRTUAL VISIT:  10/02/18    FUTURE OFFICE/VIRTUAL VISIT:  None    Lab Results   Component Value Date    A1C 6.7 08/21/2019    A1C 7.1 03/20/2019    A1C 8.5 10/02/2018    A1C 8.7 07/31/2018    A1C 9.1 05/07/2018         Kelly Rosas CMA  [facility]  [place]

## 2019-10-23 ENCOUNTER — OFFICE VISIT (OUTPATIENT)
Dept: PEDIATRICS | Facility: CLINIC | Age: 39
End: 2019-10-23
Payer: COMMERCIAL

## 2019-10-23 VITALS
BODY MASS INDEX: 26.6 KG/M2 | DIASTOLIC BLOOD PRESSURE: 68 MMHG | OXYGEN SATURATION: 100 % | WEIGHT: 196.1 LBS | SYSTOLIC BLOOD PRESSURE: 132 MMHG | RESPIRATION RATE: 18 BRPM | HEART RATE: 62 BPM | TEMPERATURE: 97.8 F

## 2019-10-23 DIAGNOSIS — D22.9 MULTIPLE PIGMENTED NEVI: ICD-10-CM

## 2019-10-23 DIAGNOSIS — R80.9 TYPE 1 DIABETES MELLITUS WITH MICROALBUMINURIA (H): Primary | ICD-10-CM

## 2019-10-23 DIAGNOSIS — E10.29 TYPE 1 DIABETES MELLITUS WITH MICROALBUMINURIA (H): Primary | ICD-10-CM

## 2019-10-23 DIAGNOSIS — Z72.0 TOBACCO ABUSE: ICD-10-CM

## 2019-10-23 DIAGNOSIS — Z23 NEED FOR INFLUENZA VACCINATION: ICD-10-CM

## 2019-10-23 DIAGNOSIS — E78.5 HYPERLIPIDEMIA LDL GOAL <100: ICD-10-CM

## 2019-10-23 PROCEDURE — 90686 IIV4 VACC NO PRSV 0.5 ML IM: CPT | Performed by: PEDIATRICS

## 2019-10-23 PROCEDURE — 99214 OFFICE O/P EST MOD 30 MIN: CPT | Mod: 25 | Performed by: PEDIATRICS

## 2019-10-23 PROCEDURE — 90471 IMMUNIZATION ADMIN: CPT | Performed by: PEDIATRICS

## 2019-10-23 RX ORDER — LISINOPRIL 5 MG/1
5 TABLET ORAL DAILY
Qty: 90 TABLET | Refills: 3 | Status: SHIPPED | OUTPATIENT
Start: 2019-10-23 | End: 2020-10-12

## 2019-10-23 RX ORDER — SIMVASTATIN 40 MG
40 TABLET ORAL AT BEDTIME
Qty: 90 TABLET | Refills: 1 | Status: SHIPPED | OUTPATIENT
Start: 2019-10-23 | End: 2020-08-07
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
## 2019-10-23 NOTE — PATIENT INSTRUCTIONS
Schedule visit with Dr Murdock    Come back to see Brianna as you have planned with a HgbA1C prior    Flu shot today    Continue your current insulin regimen    Increase your lisinopril dose from 2.5 to 5mg - your bp was up a bit today    Call for visit at [facility] - have them check moles on your back.  Start using your lotion twice daily.    Call for eye visit - important over this winter - think about [facility] in [place]

## 2019-11-13 ENCOUNTER — TELEPHONE (OUTPATIENT)
Dept: ENDOCRINOLOGY | Facility: CLINIC | Age: 39
End: 2019-11-13

## 2019-11-13 NOTE — TELEPHONE ENCOUNTER
Vinny with [employer] calls. They provide patient's insulin pump supplies and have forwarded the letter of medical necessity to [facility], but [facility] is requesting records supporting use be sent to [facility]. Per [facility], records have to be sent by our clinic to [facility] - they will not accept records from [employer]. Vinny is going to send a fax to the clinic requesting this, she will include a fax number for [facility] on the cover sheet.

## 2019-12-04 NOTE — TELEPHONE ENCOUNTER
Last office notes and labs faxed to [phone].    Kelly Rosas, MAYELA  [facility]  [place]

## 2019-12-18 ENCOUNTER — TELEPHONE (OUTPATIENT)
Dept: ENDOCRINOLOGY | Facility: CLINIC | Age: 39
End: 2019-12-18

## 2019-12-18 NOTE — TELEPHONE ENCOUNTER
Fax received from [employer] for review and signature.  Put in Dr. Reyna's in basket.

## 2019-12-19 ENCOUNTER — MEDICAL CORRESPONDENCE (OUTPATIENT)
Dept: HEALTH INFORMATION MANAGEMENT | Facility: CLINIC | Age: 39
End: 2019-12-19

## 2019-12-19 NOTE — TELEPHONE ENCOUNTER
Forms/paperwork reviewed, completed and signed.  Please fax or send the papers as requested, document in chart and close the encounter.    Thank you.    Marline Reyna MD

## 2019-12-19 NOTE — TELEPHONE ENCOUNTER
Form was faxed, copied, sent to scanning.      Kelly Rosas, [facility]  [place]

## 2019-12-19 NOTE — TELEPHONE ENCOUNTER
[employer] forms for pump and dm supplies.    ,LAST OFFICE/VIRTUAL VISIT:  10/08/18    FUTURE OFFICE/VIRTUAL VISIT:  04/20/19    Lab Results   Component Value Date    A1C 6.7 08/21/2019    A1C 7.1 03/20/2019    A1C 8.5 10/02/2018    A1C 8.7 07/31/2018    A1C 9.1 05/07/2018     Ready to sign    Kelly Rosas CMA  [facility]  [place]

## 2019-12-30 DIAGNOSIS — R80.9 TYPE 1 DIABETES MELLITUS WITH MICROALBUMINURIA (H): ICD-10-CM

## 2019-12-30 DIAGNOSIS — E10.29 TYPE 1 DIABETES MELLITUS WITH MICROALBUMINURIA (H): ICD-10-CM

## 2019-12-30 RX ORDER — INSULIN LISPRO 100 [IU]/ML
65 INJECTION, SOLUTION INTRAVENOUS; SUBCUTANEOUS
Qty: 65 ML | Refills: 3 | Status: SHIPPED | OUTPATIENT
Start: 2019-12-30 | End: 2019-12-31

## 2019-12-30 NOTE — TELEPHONE ENCOUNTER
"Requested Prescriptions   Pending Prescriptions Disp Refills     insulin lispro (HUMALOG) 100 UNIT/ML vial 10 mL 0     Sig: FOR USE WITH INSULIN PUMP UP TO 65 UNITS PER DAY   Last Written Prescription Date:  10/23/19  Last Fill Quantity: 10,  # refills: 3   Last office visit: 10/23/2019 with prescribing provider   Future Office Visit:  na      Short Acting Insulin Protocol Passed - 12/30/2019  2:35 PM        Passed - Blood pressure less than 140/90 in past 6 months     BP Readings from Last 3 Encounters:   10/23/19 132/68   03/20/19 118/78   10/02/18 122/81                 Passed - LDL on file in past 12 months     Recent Labs   Lab Test 08/21/19  0705   LDL 65             Passed - Microalbumin on file in past 12 months     Recent Labs   Lab Test 03/20/19  1157   MICROL <5   UMALCR Unable to calculate due to low value             Passed - Serum creatinine on file in past 12 months     Recent Labs   Lab Test 08/21/19  0705   CR 0.88             Passed - HgbA1C in past 3 or 6 months     If HgbA1C is 8 or greater, it needs to be on file within the past 3 months.  If less than 8, must be on file within the past 6 months.     Recent Labs   Lab Test 08/21/19  0705   A1C 6.7*             Passed - Medication is active on med list        Passed - Patient is age 18 or older        Passed - Recent (6 mo) or future (30 days) visit within the authorizing provider's specialty     Patient had office visit in the last 6 months or has a visit in the next 30 days with authorizing provider or within the authorizing provider's specialty.  See \"Patient Info\" tab in inbasket, or \"Choose Columns\" in Meds & Orders section of the refill encounter.            Prescription approved per [facility] Refill Protocol.  Melyssa Renner RN on 12/30/2019 at 2:56 PM    "

## 2020-01-03 ENCOUNTER — TRANSFERRED RECORDS (OUTPATIENT)
Dept: HEALTH INFORMATION MANAGEMENT | Facility: CLINIC | Age: 40
End: 2020-01-03

## 2020-03-01 ENCOUNTER — HEALTH MAINTENANCE LETTER (OUTPATIENT)
Age: 40
End: 2020-03-01

## 2020-04-06 ENCOUNTER — DOCUMENTATION ONLY (OUTPATIENT)
Dept: PEDIATRICS | Facility: CLINIC | Age: 40
End: 2020-04-06

## 2020-04-06 NOTE — PROGRESS NOTES
Patient should get labs done as scheduled.   Thanks!    Lorelei Louis MD  Internal Medicine - Pediatrics

## 2020-04-06 NOTE — PROGRESS NOTES
Patient has an upcoming lab only. Please review to decide if it the labs are needed to be done or should be deferred to a later date. Thanks!

## 2020-04-08 DIAGNOSIS — E10.29 TYPE 1 DIABETES MELLITUS WITH MICROALBUMINURIA (H): ICD-10-CM

## 2020-04-08 DIAGNOSIS — R80.9 TYPE 1 DIABETES MELLITUS WITH MICROALBUMINURIA (H): ICD-10-CM

## 2020-04-08 LAB
ANION GAP SERPL CALCULATED.3IONS-SCNC: 6 MMOL/L (ref 3–14)
BUN SERPL-MCNC: 17 MG/DL (ref 7–30)
CALCIUM SERPL-MCNC: 9.5 MG/DL (ref 8.5–10.1)
CHLORIDE SERPL-SCNC: 108 MMOL/L (ref 94–109)
CO2 SERPL-SCNC: 27 MMOL/L (ref 20–32)
CREAT SERPL-MCNC: 0.91 MG/DL (ref 0.66–1.25)
GFR SERPL CREATININE-BSD FRML MDRD: >90 ML/MIN/{1.73_M2}
GLUCOSE SERPL-MCNC: 77 MG/DL (ref 70–99)
HBA1C MFR BLD: 7.1 % (ref 0–5.6)
POTASSIUM SERPL-SCNC: 4.3 MMOL/L (ref 3.4–5.3)
SODIUM SERPL-SCNC: 141 MMOL/L (ref 133–144)

## 2020-04-08 PROCEDURE — 80048 BASIC METABOLIC PNL TOTAL CA: CPT | Performed by: PEDIATRICS

## 2020-04-08 PROCEDURE — 83036 HEMOGLOBIN GLYCOSYLATED A1C: CPT | Performed by: PEDIATRICS

## 2020-04-08 PROCEDURE — 36415 COLL VENOUS BLD VENIPUNCTURE: CPT | Performed by: PEDIATRICS

## 2020-04-13 ENCOUNTER — MYC MEDICAL ADVICE (OUTPATIENT)
Dept: ENDOCRINOLOGY | Facility: CLINIC | Age: 40
End: 2020-04-13

## 2020-04-19 ENCOUNTER — MYC MEDICAL ADVICE (OUTPATIENT)
Dept: ENDOCRINOLOGY | Facility: CLINIC | Age: 40
End: 2020-04-19

## 2020-04-20 ENCOUNTER — VIRTUAL VISIT (OUTPATIENT)
Dept: ENDOCRINOLOGY | Facility: CLINIC | Age: 40
End: 2020-04-20
Payer: COMMERCIAL

## 2020-04-20 DIAGNOSIS — R80.9 TYPE 1 DIABETES MELLITUS WITH MICROALBUMINURIA (H): ICD-10-CM

## 2020-04-20 DIAGNOSIS — E10.29 TYPE 1 DIABETES MELLITUS WITH MICROALBUMINURIA (H): ICD-10-CM

## 2020-04-20 PROCEDURE — 99214 OFFICE O/P EST MOD 30 MIN: CPT | Mod: 95 | Performed by: INTERNAL MEDICINE

## 2020-04-20 PROCEDURE — 95251 CONT GLUC MNTR ANALYSIS I&R: CPT | Performed by: INTERNAL MEDICINE

## 2020-04-20 NOTE — PROGRESS NOTES
"This is a telephone encounter.    Start time: 9:04    End time: 9:30    More than 10 min spent reviewing chart, labs, results, imaging studies and in patient communication.    The patient has been notified of following:      \"This telephone visit will be conducted via a call between you and your physician/provider. We have found that certain health care needs can be provided without the need for a physical exam.  This service lets us provide the care you need with a short phone conversation.  If a prescription is necessary we can send it directly to your pharmacy.  If lab work is needed we can place an order for that and you can then stop by our lab to have the test done at a later time.     We will bill your insurance company for this service.  Please check with your medical insurance if this type of visit is covered. You may be responsible for the cost of this type of visit if insurance coverage is denied.  The typical cost is $30 (10min), $59 (11-20min) and $85 (21-30min).  Most often these visits are shorter than 10 minutes. With new updates with corona virus patient might be billed as clinic visit.     If during the course of the call the physician/provider feels a telephone visit is not appropriate, you will not be charged for this service.\"      Past medical history, social history, family history, allergy and medications were reviewed and updated as appropriate.  Reviewed all pertinent labs, notes and imaging studies as appropriate.    Patient verbally consented to phone visit today: yes.    Disclaimer: This note consists of symbols derived from keyboarding, dictation and/or voice recognition software. As a result, there may be errors in the script that have gone undetected. Please consider this when interpreting information found in this chart.    In the setting of COVID-19 outbreak patients visits are transitioned to phone visits/ virtual visits as per system and leadership guidelines.  I have personally " reviewed data including notes, lab tests. I have reviewed and interpreted images personally.  This encounter is potentially equivalent to established 3 level (35728) clinic visit.+ CGM code.    ROS neg expect noted in notes.  Patient feels well at this time and denies any tachycardia, palpitations, heat intolerance, tremor, insomnia, diarrhea, or unexplained weight loss.  Patient also denies  cold intolerance, constipation, or unexplained weight gain.   Last visit 10/2018.  Endocrinology Clinic Note:  Name: Ge Medellin  Seen for f/u Diabetes.  HPI:  Ge Medellin is a 39 year old male who presents for the evaluation/management of type 1 diabetes.   has a past medical history of Diabetes (2008).    Was seen by  in past. But lost follow-up.  Using insulin pump- Medtronic 670 G with sensors. ( since 2019)  Humalog with insulin pump.  Reports that he has long acting insulin in case of pump malfunction at home. Ge also has glucagon kit at home for emergency.  ( not listed in med list but he has it at home)    Works 2 jobs.  Has noticed that BG are higher overnight.       1. Type 1 DM:  Orginally diagnosed: 2007  Hospitalization for DKA: no    Current Regimen:   yes:     Diabetes Medication(s)     Insulin       insulin lispro (HUMALOG) 100 UNIT/ML vial    FOR USE WITH INSULIN PUMP. UP  UNITS PER DAY     INSULIN PUMP - OUTPATIENT    Date last updated:  01/21/19  Medtronic Minimed: Model 670G- Auto mode  BASAL RATES and times:  12 AM: 1.2 units/hour    9 AM: 1.0  1 PM: 0.9  6 PM 1.2  CARB RATIO and times:  12   AM (midnight): 8.0  10 AM:  9.5  3 PM: 7.5  Corection Factor (Sensitivity) and times:  12   AM (midnight): 40 mg/dL  BLOOD GLUCOSE TARGET and times:  12   AM (midnight): 120 - 120  Active Insulin Time:  3.0 hours  [facility]Link: yes          BS checks: 2-3 times a day  Average Meter Download: reviewed. See nursing note.  He is able to feel symptoms of hypoglycemia      Fixed meal pattern: yes  Patient  counting carbs: yes  Using PUMP: no      DM Complications:   Complications:   Diabetes Complications  Description / Detail    Diabetic Retinopathy  No   CAD / PAD  No   Neuropathy  No   Nephropathy / Microalbuminuria  No  Lab Results   Component Value Date    UMALCR 11.61 2018         Gastroparesis  No   Hypoglycemia [place]  No          2. Hypertension: Blood Pressure today:   BP Readings from Last 3 Encounters:   10/23/19 132/68   19 118/78   10/02/18 122/81   on medication  3. Hyperlipidemia:  On medication    PMH/PSH:  Past Medical History:   Diagnosis Date     Diabetes 2008    type I     Past Surgical History:   Procedure Laterality Date     none       Family Hx:  Family History   Problem Relation Age of Onset     Hypertension Father      C.A.D. Father         9 stents - 1st one at 48     Cancer Paternal Grandmother         lung cancer     Diabetes No family hx of            DM2:             Social Hx:  Social History     Socioeconomic History     Marital status: Single     Spouse name: Not on file     Number of children: Not on file     Years of education: Not on file     Highest education level: Not on file   Occupational History     Not on file   Social Needs     Financial resource strain: Not on file     Food insecurity     Worry: Not on file     Inability: Not on file     Transportation needs     Medical: Not on file     Non-medical: Not on file   Tobacco Use     Smoking status: Light Tobacco Smoker     Packs/day: 0.50     Years: 14.00     Pack years: 7.00     Types: Cigarettes     Last attempt to quit: 2010     Years since quittin.7     Smokeless tobacco: Never Used   Substance and Sexual Activity     Alcohol use: Yes     Comment: Only two ultra beers after work. Contains 2.3 carbs each.     Drug use: No     Sexual activity: Yes     Partners: Female     Birth control/protection: Condom   Lifestyle     Physical activity     Days per week: Not on file     Minutes per session: Not on  file     Stress: Not on file   Relationships     Social connections     Talks on phone: Not on file     Gets together: Not on file     Attends Zoroastrian service: Not on file     Active member of club or organization: Not on file     Attends meetings of clubs or organizations: Not on file     Relationship status: Not on file     Intimate partner violence     Fear of current or ex partner: Not on file     Emotionally abused: Not on file     Physically abused: Not on file     Forced sexual activity: Not on file   Other Topics Concern     Parent/sibling w/ CABG, MI or angioplasty before 65F 55M? Yes   Social History Narrative    Previously lived in [place] - moved to [place] in 2010.  Works for [employer] in [employer] - works overnights.  Heavy lifting and stocking work.   2012          MEDICATIONS:  has a current medication list which includes the following prescription(s): aspirin not prescribed, blood glucose monitoring, blood glucose, blood glucose, diabetic sterile lancets, blood glucose, insulin lispro, insulin pump, lisinopril, and simvastatin.    ROS     ROS: 10 point ROS neg other than the symptoms noted above in the HPI.    Physical Exam   VS: There were no vitals taken for this visit.      LABS:  A1c:  Lab Results   Component Value Date    A1C 7.1 04/08/2020    A1C 6.7 08/21/2019    A1C 7.1 03/20/2019    A1C 8.5 10/02/2018    A1C 8.7 07/31/2018           BMP:  Creatinine   Date Value Ref Range Status   04/08/2020 0.91 0.66 - 1.25 mg/dL Final       Urine Micro:  Lab Results   Component Value Date    UMALCR Unable to calculate due to low value 03/20/2019        LFTs/Lipids:  Recent Labs   Lab Test 08/21/19  0705 03/20/19  1157 12/11/17  0835  10/31/15  0923 12/27/14  0922   CHOL 143  --  144   < > 172 138   HDL 67  --  60   < > 65 47   LDL 65 75 69   < > 97 78   TRIG 54  --  73   < > 48 65   CHOLHDLRATIO  --   --   --   --  2.6 2.9    < > = values in this interval not displayed.     Liver Function Studies -   Recent  Labs   Lab Test 08/21/19  0705   PROTTOTAL 7.5   ALBUMIN 4.2   BILITOTAL 0.6   ALKPHOS 60   AST 36   ALT 35         TFTs:  TSH   Date Value Ref Range Status   02/05/2018 0.84 0.40 - 4.00 mU/L Final     Blood Glucose download and pump data/ Meter reviewed.     All pertinent notes, labs, and images personally reviewed by me.     A/P  Mr.Eric Medellin is a 39 year old here for the evaluation/management of diabetes:    1. DM1 - Under fair control.  A1c 7.1%.  No known complications from diabetes.  In general BG high overnight. Works 2 jobs. Dinner is around 11:00 PM on the days when he is working late.  Plan:  Base don Bg data he is using auto mode about 89% of time.  Recommend to increase I:C ratio at 1500 to 1:7 ( from 1:7.7)  Rest setting same.  Labs and f/u in 3 months.    2. Hypertension - Takes lisinopril 2.5 mg      3. Hyperlipidemia - Takes simvastatin 40 mg    4. Prevention  ASA- NA- 2/2 to age.  Smoking- no    Most Recent Immunizations   Administered Date(s) Administered     FLU 6-35 months 09/17/2016     Influenza (IIV3) PF 10/10/2012     Influenza Intranasal Vaccine 4 valent 09/22/2015     Influenza Vaccine IM > 6 months Valent IIV4 10/23/2019     Pneumococcal 23 valent 10/10/2012     TDAP Vaccine (Adacel) 10/10/2012     Recommend checking blood sugars before meals and at bedtime.    If Blood glucose are low more often-> 2-3 times/week- give us a call.  The patient is advised to Make better food choices: reduce carbs, Reduce portion size, weight loss and exercise 3-4 times a week.  Discussed hypoglycemia signs and symptoms as well as management in detail.      There is some variability among people, most will usually develop symptoms suggestive of hypoglycemia when blood glucose levels are lowered to the mid 60's. The first set of symptoms are called adrenergic. Patients may experience any of the following nervousness, sweating, intense hunger, trembling, weakness, palpitations, and difficulty speaking. When BS  fall below 50 the patient is unable to talk and take oral therapy.  Would recommend Glucagon emergency kit for the patient and education for family and friends around the patient.   The acute management of hypoglycemia involves the rapid delivery of a source of easily absorbed sugar. Regular soda, juice, lifesavers, table sugar, are good options. 15 grams of glucose is the dose that is given, followed by an assessment of symptoms and a blood glucose check if possible. If after 10 minutes there is no improvement, another 10-15 grams should be given. This can be repeated up to three times. The equivalency of 10-15 grams of glucose (approximate servings) are: Four lifesavers, 4 teaspoons of sugar, or 1/2 cup or 4 oz of juice or regular pop.          Follow-up:  follow up in 3 month(s)    Marline Reyna M.D  Endocrinology  [facility]  CC: Lorelei Louis    More than 50% of face to face time spent with Mr. Medellin on counseling / coordinating his care.     All questions were answered.  The patient indicates understanding of the above issues and agrees with the plan set forth.     Disclaimer: This note consists of symbols derived from keyboarding, dictation and/or voice recognition software. As a result, there may be errors in the script that have gone undetected. Please consider this when interpreting information found in this chart.    Addendum to above note and clinic visit:    Labs reviewed.    See result note/telephone encounter.

## 2020-08-17 ENCOUNTER — TELEPHONE (OUTPATIENT)
Dept: ENDOCRINOLOGY | Facility: CLINIC | Age: 40
End: 2020-08-17

## 2020-08-18 NOTE — TELEPHONE ENCOUNTER
Form was faxed and sent to scanning.      Kelly Rosas, [facility]  [place]

## 2020-10-07 DIAGNOSIS — E10.29 TYPE 1 DIABETES MELLITUS WITH MICROALBUMINURIA (H): ICD-10-CM

## 2020-10-07 DIAGNOSIS — R80.9 TYPE 1 DIABETES MELLITUS WITH MICROALBUMINURIA (H): ICD-10-CM

## 2020-10-07 LAB — HBA1C MFR BLD: 6.8 % (ref 0–5.6)

## 2020-10-07 PROCEDURE — 83036 HEMOGLOBIN GLYCOSYLATED A1C: CPT | Performed by: INTERNAL MEDICINE

## 2020-10-11 DIAGNOSIS — E78.5 HYPERLIPIDEMIA LDL GOAL <100: ICD-10-CM

## 2020-10-11 DIAGNOSIS — R80.9 TYPE 1 DIABETES MELLITUS WITH MICROALBUMINURIA (H): ICD-10-CM

## 2020-10-11 DIAGNOSIS — E10.29 TYPE 1 DIABETES MELLITUS WITH MICROALBUMINURIA (H): ICD-10-CM

## 2020-10-12 RX ORDER — SIMVASTATIN 40 MG
TABLET ORAL
Qty: 90 TABLET | Refills: 0 | Status: SHIPPED | OUTPATIENT
Start: 2020-10-12 | End: 2021-02-02

## 2020-10-12 RX ORDER — LISINOPRIL 5 MG/1
TABLET ORAL
Qty: 90 TABLET | Refills: 0 | Status: SHIPPED | OUTPATIENT
Start: 2020-10-12 | End: 2021-02-02

## 2020-10-12 NOTE — TELEPHONE ENCOUNTER
Prescription approved per [facility].  Asher Dubose RN, BSN

## 2020-10-14 DIAGNOSIS — R80.9 TYPE 1 DIABETES MELLITUS WITH MICROALBUMINURIA (H): ICD-10-CM

## 2020-10-14 DIAGNOSIS — E10.29 TYPE 1 DIABETES MELLITUS WITH MICROALBUMINURIA (H): ICD-10-CM

## 2020-10-14 NOTE — TELEPHONE ENCOUNTER
Patient would like a refill on prescription insulin lispro (HUMALOG) 100 UNIT/ML vial. Only has 1 day left.     Malu Lundy MA

## 2020-10-15 NOTE — TELEPHONE ENCOUNTER
Medication refilled per [facility] protocol    Jeny Govea RN

## 2020-10-19 ENCOUNTER — VIRTUAL VISIT (OUTPATIENT)
Dept: PEDIATRICS | Facility: CLINIC | Age: 40
End: 2020-10-19
Payer: COMMERCIAL

## 2020-10-19 DIAGNOSIS — R80.9 TYPE 1 DIABETES MELLITUS WITH MICROALBUMINURIA (H): ICD-10-CM

## 2020-10-19 DIAGNOSIS — E10.29 TYPE 1 DIABETES MELLITUS WITH MICROALBUMINURIA (H): ICD-10-CM

## 2020-10-19 DIAGNOSIS — R25.2 NOCTURNAL MUSCLE CRAMPS: Primary | ICD-10-CM

## 2020-10-19 PROCEDURE — 99213 OFFICE O/P EST LOW 20 MIN: CPT | Mod: 95 | Performed by: PEDIATRICS

## 2020-10-19 NOTE — PROGRESS NOTES
"Ge Medellin is a 40 year old male who is being evaluated via a billable video visit.      The patient has been notified of following:     \"This video visit will be conducted via a call between you and your physician/provider. We have found that certain health care needs can be provided without the need for an in-person physical exam.  This service lets us provide the care you need with a video conversation.  If a prescription is necessary we can send it directly to your pharmacy.  If lab work is needed we can place an order for that and you can then stop by our lab to have the test done at a later time.    Video visits are billed at different rates depending on your insurance coverage.  Please reach out to your insurance provider with any questions.    If during the course of the call the physician/provider feels a video visit is not appropriate, you will not be charged for this service.\"    Patient has given verbal consent for Video visit? Yes  How would you like to obtain your AVS? MyChart  If you are dropped from the video visit, the video invite should be resent to: Send to e-mail at: [email or web address]   Will anyone else be joining your video visit? No      Subjective     Ge Medellin is a 40 year old male who presents today via video visit for the following health issues:    HPI     Concern - bilateral  leg cramping   Onset: 1-2 months   Description: Bilateral leg cramping at night when sleeping.   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  Works for [employer].    Type 1 diabetes - currently under really good control.  Recently started dating and is eating more rice " - now cutting this down and blood sugars are better.      Leg cramps have started during the evening hours while sleeping over the last couple of months.  Very rare issue prior to this time.  Sometimes can stretch them out and other times, is much more prolonged and can't resolve the painful cramp with stretching.  Happens in his sleep and this wakes him up.  This is the main time of day that this occurs.  No recent changes in hydration.  Has decreased caffeine over time.  The right leg is more impacted in the calf muscle.  No other muscles are impacted.    No changes in strength or sensation.  No neuropathy symptoms.  Wearing good shoes.     Job has not changed - same number of steps and sitting.  No new exercise moves or physically taxing activities.    Drinking water throughout the day.  Staying well hydrated.        Review of Systems   Constitutional, HEENT, cardiovascular, pulmonary, gi and gu systems are negative, except as otherwise noted.      Objective           Vitals:  No vitals were obtained today due to virtual visit.    Physical Exam     GENERAL: Healthy, alert and no distress  EYES: Eyes grossly normal to inspection.  No discharge or erythema, or obvious scleral/conjunctival abnormalities.  RESP: No audible wheeze, cough, or visible cyanosis.  No visible retractions or increased work of breathing.    NEURO: Cranial nerves grossly intact.  Mentation and speech appropriate for age.  PSYCH: Mentation appears normal, affect normal/bright, judgement and insight intact, normal speech and appearance well-groomed.      Past labs reviewed - last A1C of 7.1%          ICD-10-CM    1. Nocturnal muscle cramps  R25.2 Unclear reason to worsen over last 2 months. Plan to promote hydration, stretch calves throughout the day, start magnesium and b complex vitamin supplementation.  Patient to update me via MyChart with how his symptoms are responding to these measures.  If not improving, can consider additional work up  including lab work and PSG         2. Type 1 diabetes mellitus with microalbuminuria (H)  E10.29 Hemoglobin A1c    R80.9 Albumin Random Urine Quantitative with Creat Ratio     Comprehensive metabolic panel     Lipid panel reflex to direct LDL Fasting     TSH with free T4 reflex    Future orders placed so ready for next lab work.  Control much improved with insulin pump.  Encouraged patient to continue with his great efforts.           Video-Visit Details    Type of service:  Video Visit    Video End Time:10:49 AM    Originating Location (pt. Location): Home    Distant Location (provider location):  [facility]     Platform used for Video Visit: [employer]

## 2020-11-11 ENCOUNTER — MYC MEDICAL ADVICE (OUTPATIENT)
Dept: ENDOCRINOLOGY | Facility: CLINIC | Age: 40
End: 2020-11-11

## 2020-11-13 NOTE — TELEPHONE ENCOUNTER
Please see patient's [employer] message below regarding resetting his insulin pump.    Please advise, thanks.

## 2020-11-16 NOTE — TELEPHONE ENCOUNTER
Endo staff- can you please take a look into this?  Can you please ask him to download his pump so that we can get current pump settings?  As well as blood sugar data for last 10-15 days?    I think diabetic educator consult will also be useful to go over the settings and instructions with his structure change in job.  Let me know what he thinks and I can put the referral in system.    Marline Reyna MD

## 2020-11-23 NOTE — TELEPHONE ENCOUNTER
Reviewed CGM data.  In general blood sugars are variable.  Starting December 21 that she is starting different work hours.  He will be working overnight.  Current CGM data shows that in general blood sugars are on higher side especially postmeal.  Recommend to take bolus 5-10 minutes prior to each meal.  He is using auto mode about 88% of time  So when he is switched to night shift-auto mode will adjust itself but he will have to focus on taking bolus correctly.  Continue current settings for now  [facility] blood sugar data again in 1-2 weeks afte starting new work hours  Try to be in auto mode most of the time    Please inform patient.

## 2020-12-14 ENCOUNTER — HEALTH MAINTENANCE LETTER (OUTPATIENT)
Age: 40
End: 2020-12-14

## 2020-12-16 ENCOUNTER — TELEPHONE (OUTPATIENT)
Dept: ENDOCRINOLOGY | Facility: CLINIC | Age: 40
End: 2020-12-16

## 2020-12-16 NOTE — TELEPHONE ENCOUNTER
Medtronic form for pump and dm supplies.    LAST OFFICE/VIRTUAL VISIT:  04/20/20    FUTURE OFFICE/VIRTUAL VISIT:  None    Lab Results   Component Value Date    A1C 6.8 10/07/2020    A1C 7.1 04/08/2020    A1C 6.7 08/21/2019    A1C 7.1 03/20/2019    A1C 8.5 10/02/2018         Kelly Rosas CMA  [facility]  [place]

## 2020-12-17 NOTE — TELEPHONE ENCOUNTER
Form was faxed and sent to scanning.      Kelly Rosas, [facility] Endocrinology  [place]

## 2021-01-15 ENCOUNTER — HEALTH MAINTENANCE LETTER (OUTPATIENT)
Age: 41
End: 2021-01-15

## 2021-01-30 DIAGNOSIS — E10.29 TYPE 1 DIABETES MELLITUS WITH MICROALBUMINURIA (H): ICD-10-CM

## 2021-01-30 DIAGNOSIS — E78.5 HYPERLIPIDEMIA LDL GOAL <100: ICD-10-CM

## 2021-01-30 DIAGNOSIS — R80.9 TYPE 1 DIABETES MELLITUS WITH MICROALBUMINURIA (H): ICD-10-CM

## 2021-02-01 DIAGNOSIS — R80.9 TYPE 1 DIABETES MELLITUS WITH MICROALBUMINURIA (H): ICD-10-CM

## 2021-02-01 DIAGNOSIS — E10.29 TYPE 1 DIABETES MELLITUS WITH MICROALBUMINURIA (H): ICD-10-CM

## 2021-02-02 RX ORDER — SIMVASTATIN 40 MG
TABLET ORAL
Qty: 90 TABLET | Refills: 0 | Status: SHIPPED | OUTPATIENT
Start: 2021-02-02 | End: 2021-05-04

## 2021-02-02 RX ORDER — LISINOPRIL 5 MG/1
TABLET ORAL
Qty: 90 TABLET | Refills: 0 | Status: SHIPPED | OUTPATIENT
Start: 2021-02-02 | End: 2021-05-04

## 2021-02-02 NOTE — TELEPHONE ENCOUNTER
Refill sent x 3 mo.  Forwarded to station to help patient schedule follow up visit.    Lorelei Louis MD  Internal Medicine - Pediatrics

## 2021-02-02 NOTE — TELEPHONE ENCOUNTER
Routing refill request to provider for review/approval because:  Labs not current:    Lab Test 08/21/19  0705   LDL 65

## 2021-02-02 NOTE — TELEPHONE ENCOUNTER
1st attempt. Left vm for patient to call us back. When patient calls back please assist with scheduling an appointment.

## 2021-02-03 ENCOUNTER — TELEPHONE (OUTPATIENT)
Dept: ENDOCRINOLOGY | Facility: CLINIC | Age: 41
End: 2021-02-03

## 2021-02-03 NOTE — TELEPHONE ENCOUNTER
He is on pump.  Does he need vials or pens?  Last visit 4/2020.  Due for visit and f/u.  Can send limited supply.    Endo staff- can you please take a look into this?  Please check with pt.    Please pend the orders with correct quantity, select pharmacy and then send for signature. Also associate with correct diagnosis.    Thank you.    Marline Reyna MD    Lab Results   Component Value Date    A1C 6.8 10/07/2020    A1C 7.1 04/08/2020    A1C 6.7 08/21/2019    A1C 7.1 03/20/2019    A1C 8.5 10/02/2018

## 2021-02-03 NOTE — TELEPHONE ENCOUNTER
Fax received from [facility] for review and signature.  Put in Dr. Reyna's in basket.

## 2021-02-03 NOTE — TELEPHONE ENCOUNTER
Routing refill request to provider for review/approval because:  rx came for Lorelei Louis, pt sees endo-Dr Collins, please confirm route to Dr Collins if appropriate  Brianna Tovar RN, BSN  Message handled by CLINIC NURSE.

## 2021-02-03 NOTE — TELEPHONE ENCOUNTER
[employer] needs notes from the last visit.  They were faxed.    Kelly Rosas CMA  [facility]  [place]

## 2021-02-04 RX ORDER — INSULIN LISPRO 100 [IU]/ML
INJECTION, SOLUTION INTRAVENOUS; SUBCUTANEOUS
Qty: 30 ML | Refills: 0 | Status: CANCELLED | OUTPATIENT
Start: 2021-02-04

## 2021-02-04 NOTE — TELEPHONE ENCOUNTER
Vials.  For Medtronic 760.  Will make follow up appt.    Kelly Rosas CMA  [facility]  [place]     Pulses equal bilaterally, no edema present.

## 2021-02-05 NOTE — TELEPHONE ENCOUNTER
Called patient to schedule, patient stated he will get on MyChart and schedule his own appointments.     PAL offered follow-up denied. Set reminder for 2 weeks to make sure it is completed.     Rona Madrid, EMT at 11:56 AM on February 5, 2021  [facility]   [phone]

## 2021-02-18 DIAGNOSIS — R80.9 TYPE 1 DIABETES MELLITUS WITH MICROALBUMINURIA (H): ICD-10-CM

## 2021-02-18 DIAGNOSIS — E10.29 TYPE 1 DIABETES MELLITUS WITH MICROALBUMINURIA (H): ICD-10-CM

## 2021-02-18 LAB — HBA1C MFR BLD: 6.4 % (ref 0–5.6)

## 2021-02-18 PROCEDURE — 80061 LIPID PANEL: CPT | Performed by: PEDIATRICS

## 2021-02-18 PROCEDURE — 83036 HEMOGLOBIN GLYCOSYLATED A1C: CPT | Performed by: PEDIATRICS

## 2021-02-18 PROCEDURE — 36415 COLL VENOUS BLD VENIPUNCTURE: CPT | Performed by: PEDIATRICS

## 2021-02-18 PROCEDURE — 80053 COMPREHEN METABOLIC PANEL: CPT | Performed by: PEDIATRICS

## 2021-02-18 PROCEDURE — 84443 ASSAY THYROID STIM HORMONE: CPT | Performed by: PEDIATRICS

## 2021-02-18 PROCEDURE — 82043 UR ALBUMIN QUANTITATIVE: CPT | Performed by: PEDIATRICS

## 2021-02-19 LAB
ALBUMIN SERPL-MCNC: 4.1 G/DL (ref 3.4–5)
ALP SERPL-CCNC: 63 U/L (ref 40–150)
ALT SERPL W P-5'-P-CCNC: 35 U/L (ref 0–70)
ANION GAP SERPL CALCULATED.3IONS-SCNC: 5 MMOL/L (ref 3–14)
AST SERPL W P-5'-P-CCNC: 28 U/L (ref 0–45)
BILIRUB SERPL-MCNC: 0.6 MG/DL (ref 0.2–1.3)
BUN SERPL-MCNC: 13 MG/DL (ref 7–30)
CALCIUM SERPL-MCNC: 10.1 MG/DL (ref 8.5–10.1)
CHLORIDE SERPL-SCNC: 108 MMOL/L (ref 94–109)
CHOLEST SERPL-MCNC: 136 MG/DL
CO2 SERPL-SCNC: 28 MMOL/L (ref 20–32)
CREAT SERPL-MCNC: 0.86 MG/DL (ref 0.66–1.25)
GFR SERPL CREATININE-BSD FRML MDRD: >90 ML/MIN/{1.73_M2}
GLUCOSE SERPL-MCNC: 110 MG/DL (ref 70–99)
HDLC SERPL-MCNC: 63 MG/DL
LDLC SERPL CALC-MCNC: 64 MG/DL
NONHDLC SERPL-MCNC: 73 MG/DL
POTASSIUM SERPL-SCNC: 4.1 MMOL/L (ref 3.4–5.3)
PROT SERPL-MCNC: 7.2 G/DL (ref 6.8–8.8)
SODIUM SERPL-SCNC: 141 MMOL/L (ref 133–144)
TRIGL SERPL-MCNC: 44 MG/DL
TSH SERPL DL<=0.005 MIU/L-ACNC: 1.67 MU/L (ref 0.4–4)

## 2021-02-20 LAB
CREAT UR-MCNC: 137 MG/DL
MICROALBUMIN UR-MCNC: 6 MG/L
MICROALBUMIN/CREAT UR: 4.49 MG/G CR (ref 0–17)

## 2021-03-04 ENCOUNTER — MYC MEDICAL ADVICE (OUTPATIENT)
Dept: ENDOCRINOLOGY | Facility: CLINIC | Age: 41
End: 2021-03-04

## 2021-03-04 NOTE — TELEPHONE ENCOUNTER
Noted.    Message sent via [employer].      Kelly Rosas CMA  [facility]  [place]

## 2021-03-08 ENCOUNTER — VIRTUAL VISIT (OUTPATIENT)
Dept: ENDOCRINOLOGY | Facility: CLINIC | Age: 41
End: 2021-03-08
Payer: COMMERCIAL

## 2021-03-08 ENCOUNTER — MYC MEDICAL ADVICE (OUTPATIENT)
Dept: PEDIATRICS | Facility: CLINIC | Age: 41
End: 2021-03-08

## 2021-03-08 DIAGNOSIS — E10.29 TYPE 1 DIABETES MELLITUS WITH MICROALBUMINURIA (H): Primary | ICD-10-CM

## 2021-03-08 DIAGNOSIS — E10.65 TYPE 1 DIABETES MELLITUS WITH HYPERGLYCEMIA (H): ICD-10-CM

## 2021-03-08 DIAGNOSIS — R80.9 TYPE 1 DIABETES MELLITUS WITH MICROALBUMINURIA (H): Primary | ICD-10-CM

## 2021-03-08 PROCEDURE — 99214 OFFICE O/P EST MOD 30 MIN: CPT | Mod: 95 | Performed by: INTERNAL MEDICINE

## 2021-03-08 PROCEDURE — 95251 CONT GLUC MNTR ANALYSIS I&R: CPT | Performed by: INTERNAL MEDICINE

## 2021-03-08 NOTE — LETTER
"    3/8/2021         RE: Ge Medellin  [address]  [place] [address]        Dear Colleague,    Thank you for referring your patient, Ge Medellin, to the [facility]. Please see a copy of my visit note below.    THIS IS A VIDEO VISIT:    Phone call visit/virtual visit encounter:    Name of patient: Ge Medellin    Date of encounter: 3/8/2021    Time of start of video visit: 10:52    Video started:11:10    Video ended: 11:33    Time visit video ended:    Provider location: working from home/ [facility]    Patient location: patients home.    Mode of transmission: video/ Doximity    Verbal consent: obtained before starting visit. Pt is agreeable.      The patient has been notified of following:      \"This VIDEO visit will be conducted via a call between you and your physician/provider. We have found that certain health care needs can be provided without the need for a physical exam.  This service lets us provide the care you need with a short phone conversation.  If a prescription is necessary we can send it directly to your pharmacy.  If lab work is needed we can place an order for that and you can then stop by our lab to have the test done at a later time.     With new updates with corona virus patient might be billed as clinic visit.     If during the course of the call the physician/provider feels a telephone visit is not appropriate, you will not be charged for this service.\"      Past medical history, social history, family history, allergy and medications were reviewed and updated as appropriate.  Reviewed pertinent labs, notes, imaging studies personally.    Endocrinology Clinic Note:  Name: Ge Medellin  Seen for f/u Diabetes.  HPI:  Ge Medellin is a 40 year old male who presents for the evaluation/management of type 1 diabetes.   has a past medical history of Diabetes (2008).    Was seen by  in past. But lost follow-up.  Using insulin pump- Medtronic 670 G with sensors. (Since " 2019)  Humalog with insulin pump.  Reports that he has long acting insulin in case of pump malfunction at home. Ge also has glucagon kit at home for emergency.  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  He works 4 days a week- Sat/sun/mon/tuesday: 9:00 PM- 8:00 AM. ( has sandwich at 1:00 AM) then when back home he has breakfast. sometimes snacks at work.  On rest of the days he tries to keep consistent scheduled.       1. Type 1 DM:  Orginally diagnosed: 2007  Hospitalization for DKA: no    Current Regimen: Humalog in insulin pump      BS checks: 2-3 times a day  Average Meter Download: reviewed. See nursing note.  Noted few episodes of hypoglycemia mostly after meals.  He is able to feel symptoms of hypoglycemia  Fixed meal pattern: yes  Patient counting carbs: yes  Using PUMP:   Current Regimen:   Auto mode: 90%  Insulin pump -  Please see nursing note for pump settings from 3/8/2021.    DM Complications:   Complications:   Diabetes Complications  Description / Detail    Diabetic Retinopathy  No   CAD / PAD  No   Neuropathy  No   Nephropathy / Microalbuminuria  No  Lab Results   Component Value Date    UMALCR 11.61 02/05/2018         Gastroparesis  No   Hypoglycemia [place]  No          2. Hypertension: Blood Pressure today:   BP Readings from Last 3 Encounters:   10/23/19 132/68   03/20/19 118/78   10/02/18 122/81   on medication  3. Hyperlipidemia:  On medication    PMH/PSH:  Past Medical History:   Diagnosis Date     Diabetes 2008    type I     Past Surgical History:   Procedure Laterality Date     none       Family Hx:  Family History   Problem Relation Age of Onset     Hypertension Father      C.A.D. Father         9 stents - 1st one at 48     Cancer Paternal Grandmother         lung cancer     Diabetes No family hx of            DM2:             Social Hx:  Social History     Socioeconomic History     Marital status:      Spouse  name: Not on file     Number of children: Not on file     Years of education: Not on file     Highest education level: Not on file   Occupational History     Not on file   Social Needs     Financial resource strain: Not on file     Food insecurity     Worry: Not on file     Inability: Not on file     Transportation needs     Medical: Not on file     Non-medical: Not on file   Tobacco Use     Smoking status: Light Tobacco Smoker     Packs/day: 0.50     Years: 14.00     Pack years: 7.00     Types: Cigarettes     Last attempt to quit: 8/5/2010     Years since quitting: 10.5     Smokeless tobacco: Never Used   Substance and Sexual Activity     Alcohol use: Yes     Comment: Only two ultra beers after work. Contains 2.3 carbs each.     Drug use: No     Sexual activity: Yes     Partners: Female     Birth control/protection: Condom   Lifestyle     Physical activity     Days per week: Not on file     Minutes per session: Not on file     Stress: Not on file   Relationships     Social connections     Talks on phone: Not on file     Gets together: Not on file     Attends [employer] service: Not on file     Active member of club or organization: Not on file     Attends meetings of clubs or organizations: Not on file     Relationship status: Not on file     Intimate partner violence     Fear of current or ex partner: Not on file     Emotionally abused: Not on file     Physically abused: Not on file     Forced sexual activity: Not on file   Other Topics Concern     Parent/sibling w/ CABG, MI or angioplasty before 65F 55M? Yes   Social History Narrative    Previously lived in [place] - moved to [place] in 2010.  Works for [employer] in [employer] - works overnights.  Heavy lifting and stocking work.   2012          MEDICATIONS:  has a current medication list which includes the following prescription(s): aspirin not prescribed, blood glucose monitoring, blood glucose, blood glucose, diabetic sterile lancets, blood glucose, insulin lispro,  insulin pump, lisinopril, and simvastatin.    ROS     ROS: 10 point ROS neg other than the symptoms noted above in the HPI.    Physical Exam   VS: There were no vitals taken for this visit.  GENERAL: healthy, alert and no distress  EYES: Eyes grossly normal to inspection, conjunctivae and sclerae normal  ENT: no nose swelling, nasal discharge.  Thyroid: no apparent thyroid nodules  RESP: no audible wheeze, cough, or visible cyanosis.  No visible retractions or increased work of breathing.  Able to speak fully in complete sentences.  ABDO: not evaluated.  EXTREMITIES: no hand tremors.  NEURO: Cranial nerves grossly intact, mentation intact and speech normal  SKIN: No apparent skin lesions, rash or edema seen   PSYCH: mentation appears normal, affect normal/bright, judgement and insight intact, normal speech and appearance well-groomed      LABS:  A1c:  Lab Results   Component Value Date    A1C 6.4 02/18/2021    A1C 6.8 10/07/2020    A1C 7.1 04/08/2020    A1C 6.7 08/21/2019    A1C 7.1 03/20/2019       BMP:  Creatinine   Date Value Ref Range Status   02/18/2021 0.86 0.66 - 1.25 mg/dL Final       Urine Micro:  Lab Results   Component Value Date    UMALCR 4.49 02/18/2021        LFTs/Lipids:  Recent Labs   Lab Test 02/18/21  1533 08/21/19  0705 10/31/15  0923 10/31/15  0923 12/27/14  0922   CHOL 136 143   < > 172 138   HDL 63 67   < > 65 47   LDL 64 65   < > 97 78   TRIG 44 54   < > 48 65   CHOLHDLRATIO  --   --   --  2.6 2.9    < > = values in this interval not displayed.       TFTs:  TSH   Date Value Ref Range Status   02/18/2021 1.67 0.40 - 4.00 mU/L Final     Blood Glucose download and pump data/ Meter reviewed.     All pertinent notes, labs, and images personally reviewed by me.     A/P  Mr.Eric Medellin is a 39 year old here for the evaluation/management of diabetes:    1. DM1 - Under fair control.  A1c 7.1%.  No known complications from diabetes.  In general BG high overnight. Works 2 jobs. Dinner is around 11:00 PM on  the days when he is working late.  Plan:  Discussed diagnosis, pathophysiology, management and treatment options of condition with pt.  Change sensitivity to 50 ( from 40)  Rest of the settings will remains stable.  Upload pump/send BG in 4 weeks.  Labs in 3-6 months.  Please make a lab appointment for blood work and follow up clinic appointment in 1 week after that to discuss results.      2. Hypertension - Takes lisinopril 2.5 mg      3. Hyperlipidemia - Takes simvastatin 40 mg. LDL 64, HDL 63.    4. Prevention  ASA- NA- 2/2 to age.  Smoking- no    Most Recent Immunizations   Administered Date(s) Administered     FLU 6-35 months 09/17/2016     Influenza (IIV3) PF 10/10/2012     Influenza Intranasal Vaccine 4 valent 09/22/2015     Influenza Vaccine IM > 6 months Valent IIV4 10/23/2019     Pneumococcal 23 valent 10/10/2012     TDAP Vaccine (Adacel) 10/10/2012     Recommend checking blood sugars before meals and at bedtime.    If Blood glucose are low more often-> 2-3 times/week- give us a call.  The patient is advised to Make better food choices: reduce carbs, Reduce portion size, weight loss and exercise 3-4 times a week.  Discussed hypoglycemia signs and symptoms as well as management in detail.      There is some variability among people, most will usually develop symptoms suggestive of hypoglycemia when blood glucose levels are lowered to the mid 60's. The first set of symptoms are called adrenergic. Patients may experience any of the following nervousness, sweating, intense hunger, trembling, weakness, palpitations, and difficulty speaking. When BS fall below 50 the patient is unable to talk and take oral therapy.  Would recommend Glucagon emergency kit for the patient and education for family and friends around the patient.   The acute management of hypoglycemia involves the rapid delivery of a source of easily absorbed sugar. Regular soda, juice, lifesavers, table sugar, are good options. 15 grams of glucose  is the dose that is given, followed by an assessment of symptoms and a blood glucose check if possible. If after 10 minutes there is no improvement, another 10-15 grams should be given. This can be repeated up to three times. The equivalency of 10-15 grams of glucose (approximate servings) are: Four lifesavers, 4 teaspoons of sugar, or 1/2 cup or 4 oz of juice or regular pop.          Follow-up:  follow up in 3 month(s)    Marline Reyna M.D  Endocrinology  [facility]  CC: Lorelei Louis    More than 50% of face to face time spent with Mr. Medellin on counseling / coordinating his care.     All questions were answered.  The patient indicates understanding of the above issues and agrees with the plan set forth.     Disclaimer: This note consists of symbols derived from keyboarding, dictation and/or voice recognition software. As a result, there may be errors in the script that have gone undetected. Please consider this when interpreting information found in this chart.    Addendum to above note and clinic visit:    Labs reviewed.    See result note/telephone encounter.              Again, thank you for allowing me to participate in the care of your patient.        Sincerely,        Marline Reyna MD

## 2021-03-08 NOTE — PATIENT INSTRUCTIONS
[facility] & [place] locations   Dr Reyna, Endocrinology Department      [facility]   [address]  [place] [address]  Appointment [phone]  Fax: [phone]  [place]: Monday and Tuesday         [facility]   [address]  [place] [address]  Appointment [phone]  Fax: [phone]  [place]: Wednesday and Thursday            Please check the cost coverage and copay with insurance before recommended tests, services and medications (especially if new medications are prescribed).     If ordered, please get blood work done 1 week prior to your next appointment so they will be available to Dr. Reyna at your visit.    To provide the best diabetic care, please bring your blood glucose meter to each and every visit with your  Endocrinologist. Your blood glucose meter/insulin pump will be downloaded at every appointment.  Please arrive 15 minutes before your scheduled appointment. This will allow for your blood glucose meter/insulin pump  to be downloaded.  If you are wearing DEXCOM please bring  or sharing code from the Dexcom Clarity Appt so that it can be downloaded.  If you are using freestyle jade personal sensors please bring the reader.  If you are using TANDEM insulin pump please have your username and password to get info from Tandem website.    Change sensitivity to 50 ( from 40)  Rest of the settings will remains stable.  Upload pump/send BG in 4 weeks.  Labs in 3-6 months.  Please make a lab appointment for blood work and follow up clinic appointment in 1 week after that to discuss results.    Recommend checking blood sugars before meals and at bedtime.    If Blood glucose are low more often-> 2-3 times/week- give us a call.  The patient is advised to Make better food choices: reduce carbs, Reduce portion size, weight loss and exercise 3-4 times a week.  Discussed  hypoglycemia signs and symptoms as well as management in detail.

## 2021-03-08 NOTE — LETTER
March 23, 2021      Ge Medellin  [address]  [place] [address]              Dear Ge,      We have attempted to reach you to schedule a follow-up appointment with Dr. Louis.    Please call us back to schedule at [phone] or you can call me back directly at [phone].       Sincerely,    Rona GRAY [facility]

## 2021-03-08 NOTE — PROGRESS NOTES
"THIS IS A VIDEO VISIT:    Phone call visit/virtual visit encounter:    Name of patient: Ge Medellin    Date of encounter: 3/8/2021    Time of start of video visit: 10:52    Video started:11:10    Video ended: 11:33    Time visit video ended:    Provider location: working from home/ [facility]    Patient location: patients home.    Mode of transmission: video/ Doximity    Verbal consent: obtained before starting visit. Pt is agreeable.      The patient has been notified of following:      \"This VIDEO visit will be conducted via a call between you and your physician/provider. We have found that certain health care needs can be provided without the need for a physical exam.  This service lets us provide the care you need with a short phone conversation.  If a prescription is necessary we can send it directly to your pharmacy.  If lab work is needed we can place an order for that and you can then stop by our lab to have the test done at a later time.     With new updates with corona virus patient might be billed as clinic visit.     If during the course of the call the physician/provider feels a telephone visit is not appropriate, you will not be charged for this service.\"      Past medical history, social history, family history, allergy and medications were reviewed and updated as appropriate.  Reviewed pertinent labs, notes, imaging studies personally.    Endocrinology Clinic Note:  Name: Ge Medellin  Seen for f/u Diabetes.  HPI:  Ge Medellin is a 40 year old male who presents for the evaluation/management of type 1 diabetes.   has a past medical history of Diabetes (2008).    Was seen by  in past. But lost follow-up.  Using insulin pump- Medtronic 670 G with sensors. (Since 2019)  Humalog with insulin pump.  Reports that he has long acting insulin in case of pump malfunction at home. Ge also has glucagon kit at home for emergency.  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  He works 4 days a week- Sat/sun/mon/tuesday: 9:00 PM- 8:00 AM. ( has sandwich at 1:00 AM) then when back home he has breakfast. sometimes snacks at work.  On rest of the days he tries to keep consistent scheduled.       1. Type 1 DM:  Orginally diagnosed: 2007  Hospitalization for DKA: no    Current Regimen: Humalog in insulin pump      BS checks: 2-3 times a day  Average Meter Download: reviewed. See nursing note.  Noted few episodes of hypoglycemia mostly after meals.  He is able to feel symptoms of hypoglycemia  Fixed meal pattern: yes  Patient counting carbs: yes  Using PUMP:   Current Regimen:   Auto mode: 90%  Insulin pump -  Please see nursing note for pump settings from 3/8/2021.    DM Complications:   Complications:   Diabetes Complications  Description / Detail    Diabetic Retinopathy  No   CAD / PAD  No   Neuropathy  No   Nephropathy / Microalbuminuria  No  Lab Results   Component Value Date    UMALCR 11.61 02/05/2018         Gastroparesis  No   Hypoglycemia [place]  No          2. Hypertension: Blood Pressure today:   BP Readings from Last 3 Encounters:   10/23/19 132/68   03/20/19 118/78   10/02/18 122/81   on medication  3. Hyperlipidemia:  On medication    PMH/PSH:  Past Medical History:   Diagnosis Date     Diabetes 2008    type I     Past Surgical History:   Procedure Laterality Date     none       Family Hx:  Family History   Problem Relation Age of Onset     Hypertension Father      C.A.D. Father         9 stents - 1st one at 48     Cancer Paternal Grandmother         lung cancer     Diabetes No family hx of            DM2:             Social Hx:  Social History     Socioeconomic History     Marital status:      Spouse name: Not on file     Number of children: Not on file     Years of education: Not on file     Highest education level: Not on file   Occupational History     Not on file   Social Needs     Financial resource strain: Not on file     Food  insecurity     Worry: Not on file     Inability: Not on file     Transportation needs     Medical: Not on file     Non-medical: Not on file   Tobacco Use     Smoking status: Light Tobacco Smoker     Packs/day: 0.50     Years: 14.00     Pack years: 7.00     Types: Cigarettes     Last attempt to quit: 8/5/2010     Years since quitting: 10.5     Smokeless tobacco: Never Used   Substance and Sexual Activity     Alcohol use: Yes     Comment: Only two ultra beers after work. Contains 2.3 carbs each.     Drug use: No     Sexual activity: Yes     Partners: Female     Birth control/protection: Condom   Lifestyle     Physical activity     Days per week: Not on file     Minutes per session: Not on file     Stress: Not on file   Relationships     Social connections     Talks on phone: Not on file     Gets together: Not on file     Attends Rastafari service: Not on file     Active member of club or organization: Not on file     Attends meetings of clubs or organizations: Not on file     Relationship status: Not on file     Intimate partner violence     Fear of current or ex partner: Not on file     Emotionally abused: Not on file     Physically abused: Not on file     Forced sexual activity: Not on file   Other Topics Concern     Parent/sibling w/ CABG, MI or angioplasty before 65F 55M? Yes   Social History Narrative    Previously lived in [place] - moved to [place] in 2010.  Works for [employer] in [place] - works overnights.  Heavy lifting and stocking work.   2012          MEDICATIONS:  has a current medication list which includes the following prescription(s): aspirin not prescribed, blood glucose monitoring, blood glucose, blood glucose, diabetic sterile lancets, blood glucose, insulin lispro, insulin pump, lisinopril, and simvastatin.    ROS     ROS: 10 point ROS neg other than the symptoms noted above in the HPI.    Physical Exam   VS: There were no vitals taken for this visit.  GENERAL: healthy, alert and no distress  EYES:  Eyes grossly normal to inspection, conjunctivae and sclerae normal  ENT: no nose swelling, nasal discharge.  Thyroid: no apparent thyroid nodules  RESP: no audible wheeze, cough, or visible cyanosis.  No visible retractions or increased work of breathing.  Able to speak fully in complete sentences.  ABDO: not evaluated.  EXTREMITIES: no hand tremors.  NEURO: Cranial nerves grossly intact, mentation intact and speech normal  SKIN: No apparent skin lesions, rash or edema seen   PSYCH: mentation appears normal, affect normal/bright, judgement and insight intact, normal speech and appearance well-groomed      LABS:  A1c:  Lab Results   Component Value Date    A1C 6.4 02/18/2021    A1C 6.8 10/07/2020    A1C 7.1 04/08/2020    A1C 6.7 08/21/2019    A1C 7.1 03/20/2019       BMP:  Creatinine   Date Value Ref Range Status   02/18/2021 0.86 0.66 - 1.25 mg/dL Final       Urine Micro:  Lab Results   Component Value Date    UMALCR 4.49 02/18/2021        LFTs/Lipids:  Recent Labs   Lab Test 02/18/21  1533 08/21/19  0705 10/31/15  0923 10/31/15  0923 12/27/14  0922   CHOL 136 143   < > 172 138   HDL 63 67   < > 65 47   LDL 64 65   < > 97 78   TRIG 44 54   < > 48 65   CHOLHDLRATIO  --   --   --  2.6 2.9    < > = values in this interval not displayed.       TFTs:  TSH   Date Value Ref Range Status   02/18/2021 1.67 0.40 - 4.00 mU/L Final     Blood Glucose download and pump data/ Meter reviewed.     All pertinent notes, labs, and images personally reviewed by me.     A/P  Mr.Eric Medellin is a 39 year old here for the evaluation/management of diabetes:    1. DM1 - Under fair control.  A1c 7.1%.  No known complications from diabetes.  In general BG high overnight. Works 2 jobs. Dinner is around 11:00 PM on the days when he is working late.  Plan:  Discussed diagnosis, pathophysiology, management and treatment options of condition with pt.  Change sensitivity to 50 ( from 40)  Rest of the settings will remains stable.  Upload pump/send BG  in 4 weeks.  Labs in 3-6 months.  Please make a lab appointment for blood work and follow up clinic appointment in 1 week after that to discuss results.      2. Hypertension - Takes lisinopril 2.5 mg      3. Hyperlipidemia - Takes simvastatin 40 mg. LDL 64, HDL 63.    4. Prevention  ASA- NA- 2/2 to age.  Smoking- no    Most Recent Immunizations   Administered Date(s) Administered     FLU 6-35 months 09/17/2016     Influenza (IIV3) PF 10/10/2012     Influenza Intranasal Vaccine 4 valent 09/22/2015     Influenza Vaccine IM > 6 months Valent IIV4 10/23/2019     Pneumococcal 23 valent 10/10/2012     TDAP Vaccine (Adacel) 10/10/2012     Recommend checking blood sugars before meals and at bedtime.    If Blood glucose are low more often-> 2-3 times/week- give us a call.  The patient is advised to Make better food choices: reduce carbs, Reduce portion size, weight loss and exercise 3-4 times a week.  Discussed hypoglycemia signs and symptoms as well as management in detail.      There is some variability among people, most will usually develop symptoms suggestive of hypoglycemia when blood glucose levels are lowered to the mid 60's. The first set of symptoms are called adrenergic. Patients may experience any of the following nervousness, sweating, intense hunger, trembling, weakness, palpitations, and difficulty speaking. When BS fall below 50 the patient is unable to talk and take oral therapy.  Would recommend Glucagon emergency kit for the patient and education for family and friends around the patient.   The acute management of hypoglycemia involves the rapid delivery of a source of easily absorbed sugar. Regular soda, juice, lifesavers, table sugar, are good options. 15 grams of glucose is the dose that is given, followed by an assessment of symptoms and a blood glucose check if possible. If after 10 minutes there is no improvement, another 10-15 grams should be given. This can be repeated up to three times. The  equivalency of 10-15 grams of glucose (approximate servings) are: Four lifesavers, 4 teaspoons of sugar, or 1/2 cup or 4 oz of juice or regular pop.          Follow-up:  follow up in 3 month(s)    Marline Reyna M.D  Endocrinology  [facility]  CC: Lorelei Louis    More than 50% of face to face time spent with Mr. Medellin on counseling / coordinating his care.     All questions were answered.  The patient indicates understanding of the above issues and agrees with the plan set forth.     Disclaimer: This note consists of symbols derived from keyboarding, dictation and/or voice recognition software. As a result, there may be errors in the script that have gone undetected. Please consider this when interpreting information found in this chart.    Addendum to above note and clinic visit:    Labs reviewed.    See result note/telephone encounter.

## 2021-03-15 NOTE — TELEPHONE ENCOUNTER
Called pt. No answer, LVM to call clinic back or respond via [employer]. Pt has not read [employer] message at the time of this documentation.    If pt calls back:  Schedule annual physical (if pt willing) or video visit follow up with PCP    Benedict Lindo, EMT at 9:00 AM on March 15, 2021   [facility] Guide   [phone]

## 2021-03-23 NOTE — TELEPHONE ENCOUNTER
Called patient to schedule follow-up, no answer. LVM requesting a call back directly to PAL extension.     Sent letter as well. 4 outreach attempts made, closing encounter.     Rona Madrid, EMT at 11:40 AM on March 23, 2021  [facility] Health Guide   [phone]

## 2021-04-17 ENCOUNTER — HEALTH MAINTENANCE LETTER (OUTPATIENT)
Age: 41
End: 2021-04-17

## 2021-04-23 ENCOUNTER — IMMUNIZATION (OUTPATIENT)
Dept: NURSING | Facility: CLINIC | Age: 41
End: 2021-04-23
Payer: COMMERCIAL

## 2021-04-23 PROCEDURE — 91301 PR COVID VAC MODERNA 100 MCG/0.5 ML IM: CPT

## 2021-04-23 PROCEDURE — 0011A PR COVID VAC MODERNA 100 MCG/0.5 ML IM: CPT

## 2021-05-02 DIAGNOSIS — R80.9 TYPE 1 DIABETES MELLITUS WITH MICROALBUMINURIA (H): ICD-10-CM

## 2021-05-02 DIAGNOSIS — E78.5 HYPERLIPIDEMIA LDL GOAL <100: ICD-10-CM

## 2021-05-02 DIAGNOSIS — E10.29 TYPE 1 DIABETES MELLITUS WITH MICROALBUMINURIA (H): ICD-10-CM

## 2021-05-04 RX ORDER — LISINOPRIL 5 MG/1
TABLET ORAL
Qty: 90 TABLET | Refills: 0 | Status: SHIPPED | OUTPATIENT
Start: 2021-05-04 | End: 2021-06-25

## 2021-05-04 RX ORDER — SIMVASTATIN 40 MG
TABLET ORAL
Qty: 90 TABLET | Refills: 0 | Status: SHIPPED | OUTPATIENT
Start: 2021-05-04 | End: 2021-06-25

## 2021-05-04 NOTE — TELEPHONE ENCOUNTER
Routing refill request to provider for review/approval because:  Trupti given x1 and patient did not follow up, please advise  Outdated BP    Loan Alexandra RN on 5/4/2021 at 10:47 AM

## 2021-05-12 ENCOUNTER — TELEPHONE (OUTPATIENT)
Dept: PEDIATRICS | Facility: CLINIC | Age: 41
End: 2021-05-12

## 2021-05-12 NOTE — TELEPHONE ENCOUNTER
Forms/Letter Request    Name of form/letter: insulin-treated diabetes mellitus report    Have you been seen for this request: Yes ongoing    Do we have the form/letter: Yes: in provider folder     When is form/letter needed by: faxed no later than 6/7/21    How would you like the form/letter returned: Fax to [phone]    Patient Notified form requests are processed in 3-5 business days:Yes    Okay to leave a detailed message? Yes Home number on file [phone] (home)

## 2021-05-21 ENCOUNTER — IMMUNIZATION (OUTPATIENT)
Dept: NURSING | Facility: CLINIC | Age: 41
End: 2021-05-21
Attending: INTERNAL MEDICINE
Payer: COMMERCIAL

## 2021-05-21 PROCEDURE — 0012A PR COVID VAC MODERNA 100 MCG/0.5 ML IM: CPT

## 2021-05-21 PROCEDURE — 91301 PR COVID VAC MODERNA 100 MCG/0.5 ML IM: CPT

## 2021-06-12 ENCOUNTER — HEALTH MAINTENANCE LETTER (OUTPATIENT)
Age: 41
End: 2021-06-12

## 2021-06-20 ENCOUNTER — MYC MEDICAL ADVICE (OUTPATIENT)
Dept: ENDOCRINOLOGY | Facility: CLINIC | Age: 41
End: 2021-06-20

## 2021-06-20 DIAGNOSIS — R80.9 TYPE 1 DIABETES MELLITUS WITH MICROALBUMINURIA (H): ICD-10-CM

## 2021-06-20 DIAGNOSIS — E10.29 TYPE 1 DIABETES MELLITUS WITH MICROALBUMINURIA (H): ICD-10-CM

## 2021-06-21 ENCOUNTER — MYC REFILL (OUTPATIENT)
Dept: PEDIATRICS | Facility: CLINIC | Age: 41
End: 2021-06-21

## 2021-06-21 DIAGNOSIS — E10.29 TYPE 1 DIABETES MELLITUS WITH MICROALBUMINURIA (H): ICD-10-CM

## 2021-06-21 DIAGNOSIS — R80.9 TYPE 1 DIABETES MELLITUS WITH MICROALBUMINURIA (H): ICD-10-CM

## 2021-06-21 RX ORDER — INSULIN LISPRO 100 [IU]/ML
INJECTION, SOLUTION INTRAVENOUS; SUBCUTANEOUS
Refills: 0 | OUTPATIENT
Start: 2021-06-21

## 2021-06-21 NOTE — TELEPHONE ENCOUNTER
"Please see my chart message and advise.  Thanks    Prescription approved per [facility] Refill Protocol.    Requested Prescriptions   Pending Prescriptions Disp Refills     insulin lispro (HUMALOG) 100 UNIT/ML vial 90 mL 0     Sig: FOR USE WITH INSULIN PUMP. UP TO 70 UNITS PER DAY       Short Acting Insulin Protocol Passed - 6/21/2021 11:45 AM        Passed - Serum creatinine on file in past 12 months     Recent Labs   Lab Test 02/18/21  1533   CR 0.86       Ok to refill medication if creatinine is low          Passed - HgbA1C in past 3 or 6 months     If HgbA1C is 8 or greater, it needs to be on file within the past 3 months.  If less than 8, must be on file within the past 6 months.     Recent Labs   Lab Test 02/18/21  1533   A1C 6.4*             Passed - Medication is active on med list        Passed - Patient is age 18 or older        Passed - Recent (6 mo) or future (30 days) visit within the authorizing provider's specialty     Patient had office visit in the last 6 months or has a visit in the next 30 days with authorizing provider or within the authorizing provider's specialty.  See \"Patient Info\" tab in inbasket, or \"Choose Columns\" in Meds & Orders section of the refill encounter.                   "

## 2021-06-23 DIAGNOSIS — E10.29 TYPE 1 DIABETES MELLITUS WITH MICROALBUMINURIA (H): ICD-10-CM

## 2021-06-23 DIAGNOSIS — R80.9 TYPE 1 DIABETES MELLITUS WITH MICROALBUMINURIA (H): ICD-10-CM

## 2021-06-23 LAB — HBA1C MFR BLD: 6.6 % (ref 0–5.6)

## 2021-06-23 PROCEDURE — 36415 COLL VENOUS BLD VENIPUNCTURE: CPT | Performed by: INTERNAL MEDICINE

## 2021-06-23 PROCEDURE — 83036 HEMOGLOBIN GLYCOSYLATED A1C: CPT | Performed by: INTERNAL MEDICINE

## 2021-06-24 ENCOUNTER — MYC MEDICAL ADVICE (OUTPATIENT)
Dept: PEDIATRICS | Facility: CLINIC | Age: 41
End: 2021-06-24

## 2021-06-24 DIAGNOSIS — R80.9 TYPE 1 DIABETES MELLITUS WITH MICROALBUMINURIA (H): ICD-10-CM

## 2021-06-24 DIAGNOSIS — E10.29 TYPE 1 DIABETES MELLITUS WITH MICROALBUMINURIA (H): ICD-10-CM

## 2021-06-24 DIAGNOSIS — E78.5 HYPERLIPIDEMIA LDL GOAL <100: ICD-10-CM

## 2021-06-25 RX ORDER — BLOOD-GLUCOSE METER
EACH MISCELLANEOUS
Qty: 1 KIT | Refills: 0 | Status: SHIPPED | OUTPATIENT
Start: 2021-06-25

## 2021-06-25 RX ORDER — BLOOD SUGAR DIAGNOSTIC
STRIP MISCELLANEOUS
Qty: 100 STRIP | Refills: 6 | Status: SHIPPED | OUTPATIENT
Start: 2021-06-25 | End: 2021-06-29

## 2021-06-25 RX ORDER — LISINOPRIL 5 MG/1
5 TABLET ORAL DAILY
Qty: 90 TABLET | Refills: 0 | Status: SHIPPED | OUTPATIENT
Start: 2021-06-25 | End: 2021-08-26

## 2021-06-25 RX ORDER — GLUCOSAMINE HCL/CHONDROITIN SU 500-400 MG
CAPSULE ORAL
Qty: 100 EACH | Refills: 3 | Status: SHIPPED | OUTPATIENT
Start: 2021-06-25

## 2021-06-25 RX ORDER — SIMVASTATIN 40 MG
40 TABLET ORAL AT BEDTIME
Qty: 90 TABLET | Refills: 0 | Status: SHIPPED | OUTPATIENT
Start: 2021-06-25 | End: 2021-08-26

## 2021-06-25 NOTE — TELEPHONE ENCOUNTER
Patient reports needing to use [facility] mail order pharmacy.     Sent Lisinopril & Simvastatin as requested to mail order.     Sent [employer] message to inquire if patient needs new glucometer with all testing supplies sent as well.

## 2021-07-08 DIAGNOSIS — E10.21 TYPE 1 DIABETES MELLITUS WITH NEPHROPATHY (H): Primary | ICD-10-CM

## 2021-07-08 NOTE — TELEPHONE ENCOUNTER
"REQUESTING NEW PRESCRIPTIOSN FROM [facility] FOR MINIMED BRIANDA INFUSION SETS 43\" 6MM & PARADIMG PUMP RESERVIOR 3ML.  "

## 2021-07-13 NOTE — TELEPHONE ENCOUNTER
This RN could not find Minimed [place] order to match what was specified by patient. Routed to provider to review request.

## 2021-07-13 NOTE — TELEPHONE ENCOUNTER
Patient's cannot get these infusion sets from a pharmacy - they come from [employer] directly. Patient usually speaks directly to [employer] to get refills - not sure how we can be of help to him? He should contact [employer] to get new orders for infusion sets, and then if they need anything signed from Dr Reyna [employer] can fax that to her.    Bonita Hess RD [facility]

## 2021-07-13 NOTE — TELEPHONE ENCOUNTER
Endo staff- can you please take a look into this?  Please get help from CDE if needed to place correct wordings for order.  Please pend the orders with correct quantity, select pharmacy and then send for signature. Also associate with correct diagnosis.    Thank you.    Marline Reyna MD

## 2021-07-13 NOTE — TELEPHONE ENCOUNTER
Spoke to patient regarding Bonita's notes yet patient states  Specialty Pharmacy told him they could get this for him.     Isaias from [facility] - [place], [place] - [address]   [phone] states patient can get these through this pharmacy. Patient's cost would roughly be $194 per month.    Spoke with patient again. Patient states he does not use Paridigm pump but instead uses Minimed pump. Reviewed attached orders with patient and he confirmed what is t'd up to be accurate. Also advised we can not confirm insurance coverage or patient's final cost. Patient requesting orders be sent.

## 2021-07-14 RX ORDER — INSULIN PUMP SYRINGE, 3 ML
1 EACH MISCELLANEOUS
Qty: 30 EACH | Refills: 1 | Status: SHIPPED | OUTPATIENT
Start: 2021-07-14

## 2021-07-14 RX ORDER — INFUSION SET FOR INSULIN PUMP
1 INFUSION SETS-PARAPHERNALIA MISCELLANEOUS
Qty: 30 EACH | Refills: 1 | Status: SHIPPED | OUTPATIENT
Start: 2021-07-14

## 2021-08-09 NOTE — MR AVS SNAPSHOT
After Visit Summary   5/11/2018    Ge Medellin    MRN: [id number]           Patient Information     Date Of Birth          1980        Visit Information        Provider Department      5/11/2018 11:00 AM Fidelina Garcia, DO [facility] SPORTS MEDICINE        Today's Diagnoses     Chronic left shoulder pain    -  1    Cervicalgia          Care Instructions    1. Chronic left shoulder pain    2. Cervicalgia      Physical therapy: [facility] - [phone]  Discussed exam - pain appears to be coming from your neck  No concern for rotator cuff tear or tendonitis. No frozen shoulder  Reviewed xray - no arthritis and no finding that would predispose you to arthritis  Congrats on being ready to stop smoking.  Continue to work with Dr. Louis on controlling your blood sugar. Given how high your blood sugar is would not recommend using steroid.    Follow up after 4 - 6 therapy sessions.            Follow-ups after your visit        Additional Services     KIRT PT, HAND, AND CHIROPRACTIC REFERRAL       **This order will print in the [facility] Scheduling Office**    Physical Therapy, Hand Therapy and Chiropractic Care are available through:    *[facility]  *[facility]  *[facility]    Call one number to schedule at any of the above locations: [phone].    Your provider has referred you to: Physical Therapy at [facility] or [facility]    Indication/Reason for Referral: left shoulder pain - appears cervicogenica  Onset of Illness: see chart  Therapy Orders: Evaluate and Treat  Special Programs: None  Special Request: MDT PT please - Doreen Larsen ([place]), Rachel Kinney ([place]), Gail Orozco ([place]) or Terrell Harrison ([place])    Amadeo Childs      Additional Comments for the Therapist or Chiropractor:     Please be aware that coverage of these services is subject to the terms and limitations of your health insurance plan.   Call member services at your health plan with any benefit or coverage questions.      Please bring the following to your appointment:    *Your personal calendar for scheduling future appointments  *Comfortable clothing                  Your next 10 appointments already scheduled     Jul 31, 2018  7:20 AM CDT   LAB with EA LAB   [facility] [place] ([facility])    [address]  [address]  [place] [address] [phone]           Please do not eat 10-12 hours before your appointment if you are coming in fasting for labs on lipids, cholesterol, or glucose (sugar). This does not apply to pregnant women. Water, hot tea and black coffee (with nothing added) are okay. Do not drink other fluids, diet soda or chew gum.            Aug 06, 2018  7:00 AM CDT   Office Visit with Lorelei Louis MD   [facility] [place] ([facility])    [address]  [address]  [place] [id number] [phone]           Bring a current list of meds and any records pertaining to this visit. For Physicals, please bring immunization records and any forms needing to be filled out. Please arrive 10 minutes early to complete paperwork.              Who to contact     If you have questions or need follow up information about today's clinic visit or your schedule please contact [facility] SP[facility] MEDIC[facility] directly at [phone].  Normal or non-critical lab and imaging results will be communicated to you by MyChart, letter or phone within 4 business days after the clinic has received the results. If you do not hear from us within 7 days, please contact the clinic through MyChart or phone. If you have a critical or abnormal lab result, we will notify you by phone as soon as possible.  Submit refill requests through [employer] or call your pharmacy and they will forward the refill request to us. Please allow 3 business days for your refill to be completed.          Additional  upper/lower "Information About Your Visit        MyChart Information     [employer] lets you send messages to your doctor, view your test results, renew your prescriptions, schedule appointments and more. To sign up, go to [email or web address] . Click on \"Log in\" on the left side of the screen, which will take you to the Welcome page. Then click on \"Sign up Now\" on the right side of the page.     You will be asked to enter the access code listed below, as well as some personal information. Please follow the directions to create your username and password.     Your access code is: [email or web address]  Expires: 2018 11:09 AM     Your access code will  in 90 days. If you need help or a new code, please call your [place] clinic or [phone].        Care EveryWhere ID     This is your Care EveryWhere ID. This could be used by other organizations to access your [place] medical records  [id number]        Your Vitals Were     Height BMI (Body Mass Index)                6' (1.829 m) 26.31 kg/m2           Blood Pressure from Last 3 Encounters:   18 120/76   18 118/72   18 118/78    Weight from Last 3 Encounters:   18 194 lb (88 kg)   18 194 lb (88 kg)   18 191 lb (86.6 kg)              We Performed the Following     KIRT PT, HAND, AND CHIROPRACTIC REFERRAL        Primary Care Provider Office Phone # Fax #    Lorelei Louis [phone] [address] [place] [address]        Equal Access to Services     [facility]: Hadii teri parada hadjordan Solauren, waaxda luqadaha, qaybta kaaldanny east. So [facility] [phone].    ATENCIÓN: Si habla español, tiene a ramsey disposición servicios gratuitos de asistencia lingüística. Llame al [phone].    We comply with applicable federal civil rights laws and [place] laws. We do not discriminate on the basis of race, color, national origin, age, disability, sex, sexual " orientation, or gender identity.            Thank you!     Thank you for choosing [facility]  for your care. Our goal is always to provide you with excellent care. Hearing back from our patients is one way we can continue to improve our services. Please take a few minutes to complete the written survey that you may receive in the mail after your visit with us. Thank you!             Your Updated Medication List - Protect others around you: Learn how to safely use, store and throw away your medicines at www.disposemymeds.org.          This list is accurate as of 5/11/18 11:21 AM.  Always use your most recent med list.                   Brand Name Dispense Instructions for use Diagnosis    ASPIRIN NOT PRESCRIBED    INTENTIONAL    0 each    continuous prn Antiplatelet medication not prescribed intentionally due to Refusal by patient        * blood glucose monitoring test strip    no brand specified    450 strip    5 times daily. Dispense one touch ultra strips per insurance coverage5 times daily. Dispense one touch ultra strips per insurance coverage    Type 1 diabetes, HbA1c goal < 7% (H)       * blood glucose monitoring test strip    no brand specified    300 strip    1 strip by In Vitro route 4 times daily    Type 1 diabetes, HbA1c goal < 7% (H)       * blood glucose monitoring test strip    ONETOUCH VERIO IQ    400 each    Use to test blood sugar 4 times daily or as directed.    Type 1 diabetes mellitus with nephropathy (H)       * DIABETIC STERILE LANCETS device     1 Box    1 Device daily.    Type 1 diabetes, HbA1c goal < 7% (H)       * blood glucose monitoring lancets     2 Box    Use to test blood sugars 4 times daily or as directed.    Type 1 diabetes mellitus with nephropathy (H)       insulin lispro 100 UNIT/ML injection    HumaLOG PEN    30 mL    Inject Subcutaneous 3 times daily (before meals). 1:10 carb correction with every meal - estimated use 30 units    Type 1 diabetes mellitus  with microalbuminuria (H)       lisinopril 2.5 MG tablet    PRINIVIL/Zestril    90 tablet    Take 1 tablet (2.5 mg) by mouth daily    Type 1 diabetes mellitus with hyperglycemia (H), Type 1 diabetes mellitus with microalbuminuria (H)       simvastatin 40 MG tablet    ZOCOR    90 tablet    Take 1 tablet (40 mg) by mouth At Bedtime    Hyperlipidemia LDL goal <100, Type 1 diabetes mellitus with hyperglycemia (H), Type 1 diabetes mellitus with microalbuminuria (H)       TRESIBA FLEXTOUCH 100 UNIT/ML pen   Generic drug:  insulin degludec     45 mL    INJECT 38 UNITS SUBCUTANEOUS DAILY    Type 1 diabetes mellitus with hyperglycemia (H), Type 1 diabetes mellitus with microalbuminuria (H)       * Notice:  This list has 5 medication(s) that are the same as other medications prescribed for you. Read the directions carefully, and ask your doctor or other care provider to review them with you.

## 2021-08-17 ENCOUNTER — MEDICAL CORRESPONDENCE (OUTPATIENT)
Dept: HEALTH INFORMATION MANAGEMENT | Facility: CLINIC | Age: 41
End: 2021-08-17

## 2021-08-17 ENCOUNTER — TELEPHONE (OUTPATIENT)
Dept: ENDOCRINOLOGY | Facility: CLINIC | Age: 41
End: 2021-08-17

## 2021-08-17 NOTE — TELEPHONE ENCOUNTER
[employer] form to replace sensors in pump    LAST OFFICE/VIRTUAL VISIT:  03/08/21    FUTURE OFFICE/VIRTUAL VISIT:  None    Lab Results   Component Value Date    A1C 6.6 06/23/2021    A1C 6.4 02/18/2021    A1C 6.8 10/07/2020    A1C 7.1 04/08/2020    A1C 6.7 08/21/2019         Kelly Rosas CMA  [facility]  [place]

## 2021-08-17 NOTE — TELEPHONE ENCOUNTER
Form was faxed and sent to scanning.      Kelly Rosas, [facility]  [place]

## 2021-08-19 NOTE — TELEPHONE ENCOUNTER
[employer] calls and they did not receive the form faxed. Please fax to alternative fax number [phone].

## 2021-08-26 ENCOUNTER — MYC MEDICAL ADVICE (OUTPATIENT)
Dept: ENDOCRINOLOGY | Facility: CLINIC | Age: 41
End: 2021-08-26

## 2021-08-26 ENCOUNTER — VIRTUAL VISIT (OUTPATIENT)
Dept: PEDIATRICS | Facility: CLINIC | Age: 41
End: 2021-08-26
Payer: COMMERCIAL

## 2021-08-26 DIAGNOSIS — E78.5 HYPERLIPIDEMIA LDL GOAL <100: ICD-10-CM

## 2021-08-26 DIAGNOSIS — E10.21 TYPE 1 DIABETES MELLITUS WITH NEPHROPATHY (H): Primary | ICD-10-CM

## 2021-08-26 PROCEDURE — 99213 OFFICE O/P EST LOW 20 MIN: CPT | Mod: 95 | Performed by: PEDIATRICS

## 2021-08-26 RX ORDER — LISINOPRIL 5 MG/1
5 TABLET ORAL DAILY
Qty: 90 TABLET | Refills: 3 | Status: SHIPPED | OUTPATIENT
Start: 2021-08-26

## 2021-08-26 RX ORDER — SIMVASTATIN 40 MG
40 TABLET ORAL AT BEDTIME
Qty: 90 TABLET | Refills: 3 | Status: SHIPPED | OUTPATIENT
Start: 2021-08-26

## 2021-08-26 NOTE — PROGRESS NOTES
Ge is a 41 year old who is being evaluated via a billable video visit.      How would you like to obtain your AVS? MyChart  If the video visit is dropped, the invitation should be resent by: Send to e-mail at: [email or web address]  Will anyone else be joining your video visit? No    Video Start Time: 9:28 AM    Assessment & Plan       ICD-10-CM    1. Type 1 diabetes mellitus with nephropathy, goal A1C < 7%  E10.21 lisinopril (ZESTRIL) 5 MG tablet    Well controlled - continue ACEI and refilled for the year.  Following with Dr Reyna for insulin pump adjustments and, unfortunately, will need to transition back to insulin pens due to insurance changes.     2. Hyperlipidemia LDL goal <100  E78.5 simvastatin (ZOCOR) 40 MG tablet  Well controlled, continue current medications         0956}    Return in about 6 months (around 2/26/2022) for Routine preventive, with me, in person.    Lorelei Louis MD  [facility]    Subjective   Ge is a 41 year old who presents for the following health issues:     HPI     Diabetes Follow-up    How often are you checking your blood sugar? Four or more times daily  Blood sugar testing frequency justification:  Risk of hypoglycemia with medication(s)  What time of day are you checking your blood sugars (select all that apply)?  Before meals  Have you had any blood sugars above 200?  Yes - 243 recently   Have you had any blood sugars below 70?  No    What symptoms do you notice when your blood sugar is low?  Shaky    What concerns do you have today about your diabetes? None     Do you have any of these symptoms? (Select all that apply)  No numbness or tingling in feet.  No redness, sores or blisters on feet.  No complaints of excessive thirst.  No reports of blurry vision.  No significant changes to weight.    Have you had a diabetic eye exam in the last 12 months? No     Got  in December and had a baby - Darrian, born 3 months ago!   Doing great!    Got 6  weeks paid paternity leave    Working overnights -     Blood sugars have been under good control - the best control of his life -     Has to switch away from pumps and dexcom sensors due to insurance change from work.  Has been fighting with new insurance about covering insulin pump and CGM supplies to no avail - has given up for now and will need to transition back to insulin pens despite best glucose control of his life on current regimen.    Patient will contact Dr Reyna to get back on insulin pens.      HTN - has been under good control in general.  No new cardiac symptoms.    Quit smoking 3 months ago.      HL - tolerating statin well    VERY active at work - about 20,000 steps day    Mood has been in a good spot since blood sugars controlled.        BP Readings from Last 2 Encounters:   10/23/19 132/68   03/20/19 118/78     Hemoglobin A1C (%)   Date Value   06/23/2021 6.6 (H)   02/18/2021 6.4 (H)     LDL Cholesterol Calculated (mg/dL)   Date Value   02/18/2021 64   08/21/2019 65           Review of Systems   Constitutional, HEENT, cardiovascular, pulmonary, gi and gu systems are negative, except as otherwise noted.      Objective           Vitals:  No vitals were obtained today due to virtual visit.    Physical Exam   GENERAL: Healthy, alert and no distress  EYES: Eyes grossly normal to inspection.  No discharge or erythema, or obvious scleral/conjunctival abnormalities.  RESP: No audible wheeze, cough, or visible cyanosis.  No visible retractions or increased work of breathing.    SKIN: Visible skin clear. No significant rash, abnormal pigmentation or lesions.  NEURO: Cranial nerves grossly intact.  Mentation and speech appropriate for age.  PSYCH: Mentation appears normal, affect normal/bright, judgement and insight intact, normal speech and appearance well-groomed.    Orders Only on 06/23/2021   Component Date Value Ref Range Status     Hemoglobin A1C 06/23/2021 6.6* 0 - 5.6 % Final     Comment: Normal <5.7% Prediabetes 5.7-6.4%  Diabetes 6.5% or higher - adopted from ADA   consensus guidelines.  Reviewed: OK with previous                 Video-Visit Details    Type of service:  Video Visit    Video End Time:9:41 AM    Originating Location (pt. Location): Home    Distant Location (provider location):  [facility]     Platform used for Video Visit: [employer]

## 2021-08-26 NOTE — TELEPHONE ENCOUNTER
Patient is having difficulty getting the supplies for his pump covered since they are considered tier 3 and he is not able to afford them.  Please advise if patient should be switched back to Humalog Kwik Pens and Tresiba instead as he requested.

## 2021-08-31 NOTE — TELEPHONE ENCOUNTER
Verna/ Puma,    Can you please contact patient to help him schedule an appt so we can help him transition from the pump to multiple daily injections.    Thanks!    Joyce Marcum RN, [facility]

## 2021-08-31 NOTE — TELEPHONE ENCOUNTER
Lab Results   Component Value Date    A1C 6.6 06/23/2021    A1C 6.4 02/18/2021    A1C 6.8 10/07/2020    A1C 7.1 04/08/2020    A1C 6.7 08/21/2019     yes:     Diabetes Medication(s)       Insulin       insulin lispro (HUMALOG) 100 UNIT/ML vial    FOR USE WITH INSULIN PUMP. UP TO 70 UNITS PER DAY            Last visit 3/2021.  What is exact current DM regimen and settings?  Will need BG data  and pump data-  including settings and Total insulin dose to make transition.  Recommend DE visit for review and transition.  Your provider has referred you to Diabetes Education: For all [facility]:  Phone [phone]; Fax [phone]  Please call and make the appointment.

## 2021-09-10 ENCOUNTER — VIRTUAL VISIT (OUTPATIENT)
Dept: EDUCATION SERVICES | Facility: CLINIC | Age: 41
End: 2021-09-10
Attending: INTERNAL MEDICINE
Payer: COMMERCIAL

## 2021-09-10 DIAGNOSIS — E10.21 TYPE 1 DIABETES MELLITUS WITH NEPHROPATHY (H): ICD-10-CM

## 2021-09-10 DIAGNOSIS — E10.21 TYPE 1 DIABETES MELLITUS WITH NEPHROPATHY (H): Primary | ICD-10-CM

## 2021-09-10 PROCEDURE — 98966 PH1 ASSMT&MGMT NQHP 5-10: CPT | Mod: 95 | Performed by: DIETITIAN, REGISTERED

## 2021-09-10 NOTE — TELEPHONE ENCOUNTER
See DM encounter from today for additional details. Patient is transitioning from pump therapy back to MDI due to insurance coverage.     Pended prescriptions for Humalog pens, Tresiba pens (or Lantus), and pen needles routed to Dr. Reyna for signature. Pt is requesting 90 day supply.    Thank you,  Brianna Ramirez RD, [facility]  Diabetes

## 2021-09-10 NOTE — PATIENT INSTRUCTIONS
Recommend the following:    Tresiba 28 units daily     Humalog carb ratio:   1 per 10 (breakfast and lunch)   1 per 7 at dinner     correction 1:25 >150 mg/dl    Pended prescriptions routed to Dr. Reyna for signature.    Please let us know if any questions or concerns. Follow up as needed.     Brianna Ramirez RD, [facility]  Diabetes

## 2021-09-10 NOTE — LETTER
9/10/2021         RE: Ge Medellin  [address]  [place] [address]        Dear Colleague,    Thank you for referring your patient, Ge Medellin, to the [facility]. Please see a copy of my visit note below.    Diabetes Self-Management Education & Support    Presents for: Follow-up    Type of service:  Video Visit    If the video visit is dropped, the video visit invitation should be resent by: Send to e-mail at: [email or web address]    Originating Location (pt. Location): Home  Distant Location (provider location): Home  Mode of Communication:  Video Conference via [employer]    Video Start Time: 2:00  Video End Time (time video stopped): 2:10    How would patient like to obtain AVS? MyChart      SUBJECTIVE/OBJECTIVE:  Presents for: Follow-up  Accompanied by: Self  Diabetes education in the past 24mo: Yes  Focus of Visit: Taking Medication  Type of Pump visit: Other  Diabetes type: Type 1  Disease course: Stable  How confident are you filling out medical forms by yourself:: Extremely  Transportation concerns: No  Other concerns:: None  Cultural Influences/Ethnic Background:  Other    Diabetes Symptoms & Complications:  Symptom course: Improving  Complications assessed today?: No    Patient Problem List and Family Medical History reviewed for relevant medical history, current medical status, and diabetes risk factors.    Vitals:  There were no vitals taken for this visit.  Estimated body mass index is 26.6 kg/m  as calculated from the following:    Height as of 3/20/19: 1.829 m (6').    Weight as of 10/23/19: 89 kg (196 lb 1.6 oz).   Last 3 BP:   BP Readings from Last 3 Encounters:   10/23/19 132/68   03/20/19 118/78   10/02/18 122/81       History   Smoking Status     Former Smoker     Packs/day: 0.50     Years: 14.00     Types: Cigarettes     Quit date: 5/24/2021   Smokeless Tobacco     Never Used       Labs:  Lab Results   Component Value Date    A1C 6.6 06/23/2021     Lab Results    Component Value Date     02/18/2021     Lab Results   Component Value Date    LDL 64 02/18/2021     HDL Cholesterol   Date Value Ref Range Status   02/18/2021 63 >39 mg/dL Final   ]  GFR Estimate   Date Value Ref Range Status   02/18/2021 >90 >60 mL/min/[1.73_m2] Final     Comment:     Non  GFR Calc  Starting 12/18/2018, serum creatinine based estimated GFR (eGFR) will be   calculated using the Chronic Kidney Disease Epidemiology Collaboration   (CKD-EPI) equation.       GFR Estimate If Black   Date Value Ref Range Status   02/18/2021 >90 >60 mL/min/[1.73_m2] Final     Comment:      GFR Calc  Starting 12/18/2018, serum creatinine based estimated GFR (eGFR) will be   calculated using the Chronic Kidney Disease Epidemiology Collaboration   (CKD-EPI) equation.       Lab Results   Component Value Date    CR 0.86 02/18/2021     No results found for: MICROALBUMIN    Healthy Eating:  Healthy Eating Assessed Today: No  Cultural/Taoist diet restrictions?: No  Meals include: Lunch, Dinner  Beverages: Water, Coffee, Alcohol  Has patient met with a dietitian in the past?: Yes    Being Active:  Being Active Assessed Today: No  How intense was your typical exercise? : Moderate (like brisk walking)  Barrier to exercise: Other    Monitoring:  Monitoring Assessed Today: Yes  Blood Glucose Meter: [employer]  Times checking blood sugar at home (number): Other  Blood glucose trend: Fluctuating    Taking Medications:  Diabetes Medication(s)     Insulin       insulin lispro (HUMALOG) 100 UNIT/ML vial    FOR USE WITH INSULIN PUMP. UP TO 70 UNITS PER DAY current daily avg 84 units              Taking Medication Assessed Today: Yes  Current Treatments: Insulin Pump  Given by: Patient  Injection/Infusion sites: Abdomen    Problem Solving:  Problem Solving Assessed Today: No  Hypoglycemia Frequency: Monthly  Hypoglycemia Treatment: Glucose (tablets or gel), Other food  Patient carries a  carbohydrate source: Yes  Medical ID: No  Does patient have DKA prevention plan?: Yes  Does patient have severe weather/disaster plan for diabetes management?: Yes  Does patient have sick day plan for diabetes management?: Yes    Reducing Risks:  Reducing Risks Assessed Today: No  CAD Risks: Family history  Has dilated eye exam at least once a year?: No  Sees dentist every 6 months?: No  Feet checked by healthcare provider in the last year?: No    Healthy Coping:  Healthy Coping Assessed Today: Yes  Emotional response to diabetes: Concern for health and well-being  Informal Support system:: Family, Friends, Neighbors, Parent, Other  Stage of change: ACTION (Actively working towards change)  Patient Activation Measure Survey Score:  ELLIE Score (Last Two) 10/8/2013 10/22/2019   ELLIE Raw Score 43 38   Activation Score 68.5 83.7   ELLIE Level 4 4       Diabetes knowledge and skills assessment:   Patient is knowledgeable in diabetes management concepts related to: Healthy Eating, Being Active, Monitoring, Taking Medication, Problem Solving, Reducing Risks and Healthy Coping    Patient needs further education on the following diabetes management concepts: Taking Medication    Based on learning assessment above, most appropriate setting for further diabetes education would be: Individual setting.      INTERVENTION:    Education provided today on:  AADE Self-Care Behaviors:  Taking Medication: transition from pump therapy to MDI    Opportunities for ongoing education and support in diabetes-self management were discussed.    Pt verbalized understanding of concepts discussed and recommendations provided today.       Education Materials Provided:  No new materials provided today      ASSESSMENT:  Pt reports new health insurance will not cover pump and CGM supplies, plans to transition back to MDI in two weeks, needs prescriptions for insulin pens and pen needles.   Recommend the following based on pump report 8/31/21:    Tresiba  28 units daily   -based on pump TDD basal + 10-20%. Pt recalls previously took 28 units prior to pump start.    Humalog carb ratio: 1 per 10 (breakfast and lunch), 1 per 7 at dinner +  correction 1:25 (per 1800 rule).   -based on bolus amount per day via pump, avg 58 units due to high carb intake.   -recommend Humalog pen rx up to 70 units per day (pump TDD bolus+10-20%).     Routing pended prescriptions to Dr. Reyna for signature.    Patient's most recent   Lab Results   Component Value Date    A1C 6.6 06/23/2021    is meeting goal of <7.0    PLAN  See Patient Instructions for co-developed, patient-stated behavior change goals.  AVS printed and provided to patient today. See Follow-Up section for recommended follow-up.    Brianna Ramirez RD, [facility]  Diabetes     Time Spent: 10 minutes  Encounter Type: Individual    Any diabetes medication dose changes were made via the CDE Protocol and Collaborative Practice Agreement with the patient's endocrinology provider. A copy of this encounter was shared with the provider.

## 2021-09-10 NOTE — PROGRESS NOTES
Diabetes Self-Management Education & Support    Presents for: Follow-up    Type of service:  Video Visit    If the video visit is dropped, the video visit invitation should be resent by: Send to e-mail at: [email or web address]    Originating Location (pt. Location): Home  Distant Location (provider location): Home  Mode of Communication:  Video Conference via AppCast    Video Start Time: 2:00  Video End Time (time video stopped): 2:10    How would patient like to obtain AVS? MyChart      SUBJECTIVE/OBJECTIVE:  Presents for: Follow-up  Accompanied by: Self  Diabetes education in the past 24mo: Yes  Focus of Visit: Taking Medication  Type of Pump visit: Other  Diabetes type: Type 1  Disease course: Stable  How confident are you filling out medical forms by yourself:: Extremely  Transportation concerns: No  Other concerns:: None  Cultural Influences/Ethnic Background:  Other    Diabetes Symptoms & Complications:  Symptom course: Improving  Complications assessed today?: No    Patient Problem List and Family Medical History reviewed for relevant medical history, current medical status, and diabetes risk factors.    Vitals:  There were no vitals taken for this visit.  Estimated body mass index is 26.6 kg/m  as calculated from the following:    Height as of 3/20/19: 1.829 m (6').    Weight as of 10/23/19: 89 kg (196 lb 1.6 oz).   Last 3 BP:   BP Readings from Last 3 Encounters:   10/23/19 132/68   03/20/19 118/78   10/02/18 122/81       History   Smoking Status     Former Smoker     Packs/day: 0.50     Years: 14.00     Types: Cigarettes     Quit date: 5/24/2021   Smokeless Tobacco     Never Used       Labs:  Lab Results   Component Value Date    A1C 6.6 06/23/2021     Lab Results   Component Value Date     02/18/2021     Lab Results   Component Value Date    LDL 64 02/18/2021     HDL Cholesterol   Date Value Ref Range Status   02/18/2021 63 >39 mg/dL Final   ]  GFR Estimate   Date Value Ref Range Status    02/18/2021 >90 >60 mL/min/[1.73_m2] Final     Comment:     Non  GFR Calc  Starting 12/18/2018, serum creatinine based estimated GFR (eGFR) will be   calculated using the Chronic Kidney Disease Epidemiology Collaboration   (CKD-EPI) equation.       GFR Estimate If Black   Date Value Ref Range Status   02/18/2021 >90 >60 mL/min/[1.73_m2] Final     Comment:      GFR Calc  Starting 12/18/2018, serum creatinine based estimated GFR (eGFR) will be   calculated using the Chronic Kidney Disease Epidemiology Collaboration   (CKD-EPI) equation.       Lab Results   Component Value Date    CR 0.86 02/18/2021     No results found for: MICROALBUMIN    Healthy Eating:  Healthy Eating Assessed Today: No  Cultural/Sabianist diet restrictions?: No  Meals include: Lunch, Dinner  Beverages: Water, Coffee, Alcohol  Has patient met with a dietitian in the past?: Yes    Being Active:  Being Active Assessed Today: No  How intense was your typical exercise? : Moderate (like brisk walking)  Barrier to exercise: Other    Monitoring:  Monitoring Assessed Today: Yes  Blood Glucose Meter: ContourLiveDealt  Times checking blood sugar at home (number): Other  Blood glucose trend: Fluctuating    Taking Medications:  Diabetes Medication(s)     Insulin       insulin lispro (HUMALOG) 100 UNIT/ML vial    FOR USE WITH INSULIN PUMP. UP TO 70 UNITS PER DAY current daily avg 84 units              Taking Medication Assessed Today: Yes  Current Treatments: Insulin Pump  Given by: Patient  Injection/Infusion sites: Abdomen    Problem Solving:  Problem Solving Assessed Today: No  Hypoglycemia Frequency: Monthly  Hypoglycemia Treatment: Glucose (tablets or gel), Other food  Patient carries a carbohydrate source: Yes  Medical ID: No  Does patient have DKA prevention plan?: Yes  Does patient have severe weather/disaster plan for diabetes management?: Yes  Does patient have sick day plan for diabetes management?: Yes    Reducing  Risks:  Reducing Risks Assessed Today: No  CAD Risks: Family history  Has dilated eye exam at least once a year?: No  Sees dentist every 6 months?: No  Feet checked by healthcare provider in the last year?: No    Healthy Coping:  Healthy Coping Assessed Today: Yes  Emotional response to diabetes: Concern for health and well-being  Informal Support system:: Family, Friends, Neighbors, Parent, Other  Stage of change: ACTION (Actively working towards change)  Patient Activation Measure Survey Score:  ELLIE Score (Last Two) 10/8/2013 10/22/2019   ELLIE Raw Score 43 38   Activation Score 68.5 83.7   ELLIE Level 4 4       Diabetes knowledge and skills assessment:   Patient is knowledgeable in diabetes management concepts related to: Healthy Eating, Being Active, Monitoring, Taking Medication, Problem Solving, Reducing Risks and Healthy Coping    Patient needs further education on the following diabetes management concepts: Taking Medication    Based on learning assessment above, most appropriate setting for further diabetes education would be: Individual setting.      INTERVENTION:    Education provided today on:  AADE Self-Care Behaviors:  Taking Medication: transition from pump therapy to MDI    Opportunities for ongoing education and support in diabetes-self management were discussed.    Pt verbalized understanding of concepts discussed and recommendations provided today.       Education Materials Provided:  No new materials provided today      ASSESSMENT:  Pt reports new health insurance will not cover pump and CGM supplies, plans to transition back to MDI in two weeks, needs prescriptions for insulin pens and pen needles. Also discussed less expensive CGM option if paying out of pocket, pt will consider Libre2.     Recommend the following based on pump report 8/31/21:    Tresiba 28 units daily   -based on pump TDD basal + 10-20%. Pt recalls previously took 28 units prior to pump start.    Humalog carb ratio: 1 per 10  (breakfast and lunch), 1 per 7 at dinner +  correction 1:25 (per 1800 rule).   -based on bolus amount per day via pump, avg 58 units due to high carb intake.   -recommend Humalog pen rx up to 70 units per day (pump TDD bolus+10-20%).     Routing pended prescriptions to Dr. Reyna for signature.    Patient's most recent   Lab Results   Component Value Date    A1C 6.6 06/23/2021    is meeting goal of <7.0    PLAN  See Patient Instructions for co-developed, patient-stated behavior change goals.  AVS printed and provided to patient today. See Follow-Up section for recommended follow-up.    Brianna Ramirez RD, [facility]  Diabetes     Time Spent: 10 minutes  Encounter Type: Individual    Any diabetes medication dose changes were made via the CDE Protocol and Collaborative Practice Agreement with the patient's endocrinology provider. A copy of this encounter was shared with the provider.

## 2021-09-13 ENCOUNTER — TELEPHONE (OUTPATIENT)
Dept: ENDOCRINOLOGY | Facility: CLINIC | Age: 41
End: 2021-09-13

## 2021-09-13 DIAGNOSIS — E10.21 TYPE 1 DIABETES MELLITUS WITH NEPHROPATHY (H): ICD-10-CM

## 2021-09-13 RX ORDER — INSULIN LISPRO 100 [IU]/ML
INJECTION, SOLUTION INTRAVENOUS; SUBCUTANEOUS
Qty: 7500 ML | Refills: 3 | Status: SHIPPED | OUTPATIENT
Start: 2021-09-13

## 2021-09-13 RX ORDER — INSULIN DEGLUDEC INJECTION 100 U/ML
28 INJECTION, SOLUTION SUBCUTANEOUS AT BEDTIME
Qty: 3000 ML | Refills: 3 | Status: SHIPPED | OUTPATIENT
Start: 2021-09-13 | End: 2021-09-20

## 2021-09-13 NOTE — TELEPHONE ENCOUNTER
Brianna- can you please take a look into this?  Is pt interested in pen vs vials?  Rx sent earlier as pended.    Thank you.

## 2021-09-13 NOTE — TELEPHONE ENCOUNTER
Pharmacy is calling to ask if we wanted to send the tresiba pen instead of the vial since we sent an order for humalog kwikpen.

## 2021-09-16 NOTE — TELEPHONE ENCOUNTER
Previous note stated pens were preferred. Called the pharmacy to inform of pen request vs. Vials.     Radha Ruvalcaba MS, RD, LD, CDE

## 2021-10-02 ENCOUNTER — HEALTH MAINTENANCE LETTER (OUTPATIENT)
Age: 41
End: 2021-10-02

## 2021-10-25 DIAGNOSIS — E10.21 TYPE 1 DIABETES MELLITUS WITH NEPHROPATHY (H): ICD-10-CM

## 2021-10-25 DIAGNOSIS — E78.5 HYPERLIPIDEMIA LDL GOAL <100: ICD-10-CM

## 2021-10-26 RX ORDER — SIMVASTATIN 40 MG
TABLET ORAL
Qty: 90 TABLET | Refills: 0 | OUTPATIENT
Start: 2021-10-26

## 2021-10-26 RX ORDER — LISINOPRIL 5 MG/1
TABLET ORAL
Qty: 90 TABLET | Refills: 0 | OUTPATIENT
Start: 2021-10-26

## 2022-01-22 ENCOUNTER — HEALTH MAINTENANCE LETTER (OUTPATIENT)
Age: 42
End: 2022-01-22

## 2022-05-14 ENCOUNTER — HEALTH MAINTENANCE LETTER (OUTPATIENT)
Age: 42
End: 2022-05-14

## 2022-08-01 DIAGNOSIS — E10.21 TYPE 1 DIABETES MELLITUS WITH NEPHROPATHY (H): ICD-10-CM

## 2022-08-01 RX ORDER — INSULIN GLARGINE 100 [IU]/ML
INJECTION, SOLUTION SUBCUTANEOUS
Qty: 90 ML | Refills: 0 | OUTPATIENT
Start: 2022-08-01

## 2022-09-03 ENCOUNTER — HEALTH MAINTENANCE LETTER (OUTPATIENT)
Age: 42
End: 2022-09-03

## 2023-01-13 NOTE — PROGRESS NOTES
Diabetes Self Management Training: Personal Continuous Glucose Monitor Start    Ge Medellin presents today for initiation of personal continuous glucose monitoring with patient-owned device related to Type 1 diabetes.    He is accompanied by self    Patient's diabetes management related comments/concerns: morning blood sugars are still high    Patient would like this visit to be focused around the following diabetes-related behaviors and goals: sensor start    ASSESSMENT:    Current Diabetes Management per Patient:  Insulin Pump Type: Medtronic Minimed 670G - currently in manual mode       Diabetes Medication(s)     Insulin Sig    insulin degludec (TRESIBA FLEXTOUCH) 100 UNIT/ML pen Use for pump failure only. INJECT 40 UNITS SUBCUTANEOUS DAILY    insulin lispro (HUMALOG PEN) 100 UNIT/ML pen Use for pump failure/ DKA prevention only. Inject Subcutaneous 3 times daily (before meals). 1:10 carb +correction with every meal - estimated use 30 units    insulin lispro (HUMALOG VIAL) 100 UNIT/ML vial For use with insulin pump. Up to 65 units/day.    INSULIN PUMP - OUTPATIENT         Insulin Pump Report:            BG values are: Not in goal  Patient's most recent   Lab Results   Component Value Date    A1C 8.5 10/02/2018    is not meeting goal of <7.0    Patient experiencing hypoglycemia? yes  Symptoms of low blood sugar? yes  Patient carries a carbohydrate source with them regularly: Yes   Type of carbohydrate: glucose tablets    Nutrition:  Patient counts carbohydrates in grams    Physical Activity:  Type: Walking    CGM Preparation:  Patient completed homework (online training and/or Getting started with CGM guide)? Yes    Labs:  Lab Results   Component Value Date    A1C 8.5 10/02/2018     Lab Results   Component Value Date     07/31/2018       INTERVENTION:    Patient was able to demonstrate ability to insert and start sensor without difficulty.    Education provided on:  Medtronic 670G pump/CGM system - Guardian 3  Route resume levothyroxine, obtain     sensor, transmitter, integration with 670G insulin pump, Auto-mode, sensor glucose versus blood glucose, trends and graphs, alarms and alerts, Suspend Before Low feature, sensor insertion, taping, calibrating, [employer] Personal software & uploading    CGM initial settings:   Medtronic Enlite: Glucose Alerts: On  Low: 70 mg/dL  High: 250 mg/dL  Low snooze: 30 minutes  High snooze: 2 hours  Predictive Alert:   Low: On  High: Off  Rate Alert: Off    PLAN:  Calibrate sensor 2-3 times/day or as directed.  Change sensor, as directed.  Changes made to pump settings:   basal rate:   12 am to 9 am: increase from 1.0 --> to 1.2  9 am to 12 am: continue 1.0     Follow-up:    Follow-up appointment scheduled on Wed, Jan. 16th.  Education topics to cover at the next diabetes education visit(s): Auto mode start  Chart routed to referring provider.    Brianna Ramirez RD, CDE  Diabetes     Time Spent: 60 minutes  Encounter Type: Individual    Any diabetes medication dose changes were made via the CDE Protocol and Collaborative Practice Agreement with the patient's endocrinology provider. A copy of this encounter was shared with the provider.

## 2023-01-14 ENCOUNTER — HEALTH MAINTENANCE LETTER (OUTPATIENT)
Age: 43
End: 2023-01-14

## 2023-04-23 ENCOUNTER — HEALTH MAINTENANCE LETTER (OUTPATIENT)
Age: 43
End: 2023-04-23

## 2023-06-02 ENCOUNTER — HEALTH MAINTENANCE LETTER (OUTPATIENT)
Age: 43
End: 2023-06-02

## 2023-09-29 ENCOUNTER — MYC MEDICAL ADVICE (OUTPATIENT)
Dept: PEDIATRICS | Facility: CLINIC | Age: 43
End: 2023-09-29
Payer: COMMERCIAL

## 2023-09-29 NOTE — TELEPHONE ENCOUNTER
Patient Quality Outreach Health Maintenance - PAL RN    Summary:    PAL RN contacted pt regarding overdue health maintenance    Patient is due/failing the following:   Physical Preventive Adult Physical    Health Maintenance Due   Topic Date Due    YEARLY PREVENTIVE VISIT  Never done    ANNUAL REVIEW OF HM ORDERS  Never done    ADVANCE CARE PLANNING  Never done    HEPATITIS B IMMUNIZATION (1 of 3 - 3-dose series) Never done    EYE EXAM  08/24/2012    Pneumococcal Vaccine: Pediatrics (0 to 5 Years) and At-Risk Patients (6 to 64 Years) (2 - PCV) 10/10/2013    DIABETIC FOOT EXAM  03/20/2020    COVID-19 Vaccine (3 - Moderna series) 07/16/2021    A1C  09/23/2021    BMP  02/18/2022    LIPID  02/18/2022    MICROALBUMIN  02/18/2022    DTAP/TDAP/TD IMMUNIZATION (2 - Td or Tdap) 10/10/2022    PHQ-2 (once per calendar year)  01/01/2023    INFLUENZA VACCINE (1) 09/01/2023       Type of outreach:    Sent [employer]Up message.

## 2023-09-30 ENCOUNTER — HEALTH MAINTENANCE LETTER (OUTPATIENT)
Age: 43
End: 2023-09-30

## 2024-02-17 ENCOUNTER — HEALTH MAINTENANCE LETTER (OUTPATIENT)
Age: 44
End: 2024-02-17

## 2024-07-06 ENCOUNTER — HEALTH MAINTENANCE LETTER (OUTPATIENT)
Age: 44
End: 2024-07-06